# Patient Record
Sex: FEMALE | Race: WHITE | NOT HISPANIC OR LATINO | Employment: OTHER | URBAN - METROPOLITAN AREA
[De-identification: names, ages, dates, MRNs, and addresses within clinical notes are randomized per-mention and may not be internally consistent; named-entity substitution may affect disease eponyms.]

---

## 2017-03-30 ENCOUNTER — TELEPHONE (OUTPATIENT)
Dept: FAMILY MEDICINE | Facility: OTHER | Age: 67
End: 2017-03-30

## 2017-03-30 NOTE — LETTER
[facility]  [address]   [facility] [address]  Phone: [phone]  March 31, 2017      Marlene Khan  [address]  [facility] [address]      Dear Marlene,    We care about your health and have reviewed your health plan including your medical conditions, medications, and lab results.  Based on this review, it is recommended that you follow up regarding the following health topic(s):  -Colon Cancer Screening    We recommend you take the following action(s):  -schedule a COLONOSCOPY to look for colon cancer (due every 10 years or 5 years in higher risk situations.)  Colonoscopies can prevent 90-95% of colon cancer deaths.  Problem lesions can be removed before they ever become cancer.  If you do not wish to do a colonoscopy or cannot afford to do one at this time, there is another option called a Fecal Immunochemical Occult Blood Test (FIT) a take home stool sample kit.  It does not replace the colonoscopy for colorectal cancer screening, but it can detect hidden bleeding in the lower colon.  It does need to be repeated every year and if a positive result is obtained, you would be referred for a colonoscopy.  If you have completed either one of these tests at another facility, please have the records sent to our clinic for our records.     Please call us at the [facility] - [phone] (or use [employer]) to address the above recommendations.     Thank you for trusting [facility] and we appreciate the opportunity to serve you.  We look forward to supporting your healthcare needs in the future.    Healthy Regards,    Your Health Care Team  [facility]

## 2017-03-30 NOTE — TELEPHONE ENCOUNTER
Summary:    Patient is due/failing the following:   COLONOSCOPY    Action needed:   Schedule a colonoscopy or complete a FIT test    Type of outreach:    Phone, left message for patient to call back.     Questions for provider review:    None                                                                                                                                    Berta Mirza       Chart routed to Care Team .          Panel Management Review      Patient has the following on her problem list:     Asthma review     ACT Total Scores 12/2/2016   ACT TOTAL SCORE -   ASTHMA ER VISITS -   ASTHMA HOSPITALIZATIONS -   ACT TOTAL SCORE (Goal Greater than or Equal to 20) 25   In the past 12 months, how many times did you visit the emergency room for your asthma without being admitted to the hospital? 0   In the past 12 months, how many times were you hospitalized overnight because of your asthma? 0      1. Is Asthma diagnosis on the Problem List? Yes    2. Is Asthma listed on Health Maintenance? Yes    3. Patient is due for:  none      Composite cancer screening  Chart review shows that this patient is due/due soon for the following Colonoscopy

## 2017-04-11 ENCOUNTER — OFFICE VISIT (OUTPATIENT)
Dept: FAMILY MEDICINE | Facility: OTHER | Age: 67
End: 2017-04-11
Payer: COMMERCIAL

## 2017-04-11 VITALS
SYSTOLIC BLOOD PRESSURE: 128 MMHG | RESPIRATION RATE: 16 BRPM | HEART RATE: 64 BPM | WEIGHT: 207 LBS | TEMPERATURE: 98 F | HEIGHT: 69 IN | DIASTOLIC BLOOD PRESSURE: 78 MMHG | BODY MASS INDEX: 30.66 KG/M2

## 2017-04-11 DIAGNOSIS — Z12.11 SCREEN FOR COLON CANCER: ICD-10-CM

## 2017-04-11 DIAGNOSIS — R35.0 URINARY FREQUENCY: Primary | ICD-10-CM

## 2017-04-11 LAB
ALBUMIN UR-MCNC: NEGATIVE MG/DL
APPEARANCE UR: CLEAR
BACTERIA #/AREA URNS HPF: ABNORMAL /HPF
BILIRUB UR QL STRIP: NEGATIVE
COLOR UR AUTO: YELLOW
GLUCOSE UR STRIP-MCNC: NEGATIVE MG/DL
HGB UR QL STRIP: ABNORMAL
KETONES UR STRIP-MCNC: NEGATIVE MG/DL
LEUKOCYTE ESTERASE UR QL STRIP: NEGATIVE
NITRATE UR QL: NEGATIVE
NON-SQ EPI CELLS #/AREA URNS LPF: ABNORMAL /LPF
PH UR STRIP: 7 PH (ref 5–7)
RBC #/AREA URNS AUTO: ABNORMAL /HPF (ref 0–2)
SP GR UR STRIP: 1.02 (ref 1–1.03)
URN SPEC COLLECT METH UR: ABNORMAL
UROBILINOGEN UR STRIP-ACNC: 0.2 EU/DL (ref 0.2–1)
WBC #/AREA URNS AUTO: ABNORMAL /HPF (ref 0–2)

## 2017-04-11 PROCEDURE — 99213 OFFICE O/P EST LOW 20 MIN: CPT | Performed by: FAMILY MEDICINE

## 2017-04-11 PROCEDURE — 81001 URINALYSIS AUTO W/SCOPE: CPT | Performed by: FAMILY MEDICINE

## 2017-04-11 NOTE — NURSING NOTE
"Chief Complaint   Patient presents with     UTI     Health Maintenance     height, colon cancer, honoring choices, aap yearly       Initial /78 (BP Location: Right arm, Patient Position: Chair, Cuff Size: Adult Large)  Pulse 64  Temp 98  F (36.7  C) (Oral)  Resp 16  Ht 5' 8.9\" (1.75 m)  Wt 207 lb (93.9 kg)  BMI 30.66 kg/m2 Estimated body mass index is 30.66 kg/(m^2) as calculated from the following:    Height as of this encounter: 5' 8.9\" (1.75 m).    Weight as of this encounter: 207 lb (93.9 kg).  Medication Reconciliation: complete    "

## 2017-04-11 NOTE — PROGRESS NOTES
SUBJECTIVE:                                                    Marlene Khan is a 66 year old female who presents to clinic today for the following health issues:      HPI    URINARY TRACT SYMPTOMS     Onset: a month     Description:   Painful urination (Dysuria): YES  Blood in urine (Hematuria): YES- not visually   Delay in urine (Hesitency): YES  Not fully emptying bladder lately     Intensity: moderate    Progression of Symptoms:  same    Accompanying Signs & Symptoms:  Fever/chills: no   Flank pain YES  Nausea and vomiting: YES- nauseous   Any vaginal symptoms: none  Abdominal/Pelvic Pain: YES   History:   History of frequent UTI's: no   History of kidney stones: no   Sexually Active: YES  Possibility of pregnancy: No    Precipitating factors:   None          Therapies Tried and outcome: Cranberry juice prn (contraindicated in Coumadin patients)    Has low back pain , Difficulty emptying bladder and has burning sensation.  Has not had any children. Had hysterectomy for fibroids.      Problem list and histories reviewed & adjusted, as indicated.  Additional history: as documented      Patient Active Problem List   Diagnosis     Persistent disorder of initiating or maintaining sleep     Phobia     Lobular carcinoma in situ     Intermittent asthma     Advanced directives, counseling/discussion     Rosacea     DCIS (ductal carcinoma in situ) of breast     Past Surgical History:   Procedure Laterality Date     BIOPSY BREAST NEEDLE LOCALIZATION  12/2/2010    BIOPSY BREAST NEEDLE LOCALIZATION performed by CHATA DELACRUZ at  OR     BREAST LUMPECTOMY, RT/LT  12/2/2010    Breast Lumpectomy RT/LT followed by [facility]     C TOTAL ABDOM HYSTERECTOMY  11/24/00    Hysterectomy, Total Abdominal, lysis of adhesions     HC COLONOSCOPY W/WO BRUSH/WASH  08/03/00     HC REMOVAL OF OVARY/TUBE(S)  11/24/00    Salpingo-Oophorectomy, bilateral     HC REPAIR OF NASAL SEPTUM      correction of deviated nasal septum        Social History   Substance Use Topics     Smoking status: Never Smoker     Smokeless tobacco: Never Used      Comment: no smokers in household     Alcohol use Yes      Comment: Approx 3 drinks q week     Family History   Problem Relation Age of Onset     Arthritis Mother      Hypertension Mother      GASTROINTESTINAL DISEASE Mother      GERD     Prostate Cancer Father      Blood Disease Father      unknown what type at this point.     Prostate Cancer Brother      CANCER Maternal Aunt      breast cancer diagnosed in 50s         Current Outpatient Prescriptions   Medication Sig Dispense Refill     raloxifene (EVISTA) 60 MG tablet Take 1 tablet (60 mg) by mouth daily 90 tablet 3     doxycycline (VIBRAMYCIN) 50 MG capsule TAKE 1 CAPSULE (50 MG) BY MOUTH DAILY AS NEEDED 90 capsule 3     naproxen (NAPROSYN) 500 MG tablet Take 1 tablet (500 mg) by mouth 2 times daily as needed for moderate pain 30 tablet 1     triamcinolone (NASACORT AQ) 55 MCG/ACT nasal inhaler Spray 2 sprays into both nostrils daily 1 Bottle 11     Loratadine (CLARITIN PO)        multivitamin (OCUVITE) TABS Take 1 tablet by mouth daily       Multiple Vitamin (MULTIVITAMIN OR) Take 1 tablet by mouth daily.       Cranberry 300 MG TABS Take 1 tablet by mouth 2 times daily.       CALCIUM-VITAMIN D PO Take 2 tablets by mouth 2 times daily.       Allergies   Allergen Reactions     Augmentin      Latex      Sulfa Drugs      BP Readings from Last 3 Encounters:   04/11/17 128/78   12/02/16 140/80   07/13/16 139/80    Wt Readings from Last 3 Encounters:   04/11/17 207 lb (93.9 kg)   12/02/16 210 lb (95.3 kg)   04/06/16 203 lb 3.2 oz (92.2 kg)                  Labs reviewed in EPIC    ROS:  Constitutional, HEENT, cardiovascular, pulmonary, gi and gu systems are negative, except as otherwise noted.    OBJECTIVE:                                                    /78 (BP Location: Right arm, Patient Position: Chair, Cuff Size: Adult Large)  Pulse 64  Temp  "98  F (36.7  C) (Oral)  Resp 16  Ht 5' 8.9\" (1.75 m)  Wt 207 lb (93.9 kg)  BMI 30.66 kg/m2  Body mass index is 30.66 kg/(m^2).  Physical Exam   Constitutional: She appears well-developed and well-nourished.   HENT:   Head: Normocephalic and atraumatic.   Cardiovascular: Normal rate and regular rhythm.    Pulmonary/Chest: Effort normal and breath sounds normal.   Psychiatric: She has a normal mood and affect.         Diagnostic Test Results:  none      ASSESSMENT/PLAN:                                                      Problem List Items Addressed This Visit     None      Visit Diagnoses     Urinary frequency    -  Primary    Relevant Orders    *UA reflex to Microscopic and Culture ([place] and [facility] (except [place] and [place]) (Completed)    Urine Microscopic (Completed)    Screen for colon cancer           no signs of infection  Likely symptoms sec to atrophic vaginitis. Has h/o DCIS . Hormonal therapy not indicated. Advised lubricants    Alessia Mata MD  [facility]  "

## 2017-04-11 NOTE — MR AVS SNAPSHOT
"              After Visit Summary   4/11/2017    Marlene Khan    MRN: [id number]           Patient Information     Date Of Birth          1950        Visit Information        Provider Department      4/11/2017 11:40 AM Alessia Mata MD [facility]        Today's Diagnoses     Urinary frequency    -  1    Screen for colon cancer           Follow-ups after your visit        Who to contact     If you have questions or need follow up information about today's clinic visit or your schedule please contact [facility] directly at [phone].  Normal or non-critical lab and imaging results will be communicated to you by Jumohart, letter or phone within 4 business days after the clinic has received the results. If you do not hear from us within 7 days, please contact the clinic through ChipRewardst or phone. If you have a critical or abnormal lab result, we will notify you by phone as soon as possible.  Submit refill requests through RecordSled or call your pharmacy and they will forward the refill request to us. Please allow 3 business days for your refill to be completed.          Additional Information About Your Visit        MyChart Information     RecordSled gives you secure access to your electronic health record. If you see a primary care provider, you can also send messages to your care team and make appointments. If you have questions, please call your primary care clinic.  If you do not have a primary care provider, please call [phone] and they will assist you.        Care EveryWhere ID     This is your Care EveryWhere ID. This could be used by other organizations to access your [place] medical records  [id number]        Your Vitals Were     Pulse Temperature Respirations Height BMI (Body Mass Index)       64 98  F (36.7  C) (Oral) 16 5' 8.9\" (1.75 m) 30.66 kg/m2        Blood Pressure from Last 3 Encounters:   04/11/17 128/78   12/02/16 140/80   07/13/16 139/80    Weight " from Last 3 Encounters:   04/11/17 207 lb (93.9 kg)   12/02/16 210 lb (95.3 kg)   04/06/16 203 lb 3.2 oz (92.2 kg)              We Performed the Following     *UA reflex to Microscopic and Culture ([place] and [facility] (except [place] and [place])     Urine Microscopic        Primary Care Provider Office Phone # Fax #    Deedee ESTEVES MD [place] [phone]       [facility] [address]  [facility] [address]        Thank you!     Thank you for choosing [facility]  for your care. Our goal is always to provide you with excellent care. Hearing back from our patients is one way we can continue to improve our services. Please take a few minutes to complete the written survey that you may receive in the mail after your visit with us. Thank you!             Your Updated Medication List - Protect others around you: Learn how to safely use, store and throw away your medicines at www.disposemymeds.org.          This list is accurate as of: 4/11/17 12:43 PM.  Always use your most recent med list.                   Brand Name Dispense Instructions for use    CALCIUM-VITAMIN D PO      Take 2 tablets by mouth 2 times daily.       CLARITIN PO          Cranberry 300 MG Tabs      Take 1 tablet by mouth 2 times daily.       doxycycline 50 MG capsule    VIBRAMYCIN    90 capsule    TAKE 1 CAPSULE (50 MG) BY MOUTH DAILY AS NEEDED       * MULTIVITAMIN PO      Take 1 tablet by mouth daily.       * multivitamin Tabs tablet      Take 1 tablet by mouth daily       naproxen 500 MG tablet    NAPROSYN    30 tablet    Take 1 tablet (500 mg) by mouth 2 times daily as needed for moderate pain       raloxifene 60 MG tablet    Evista    90 tablet    Take 1 tablet (60 mg) by mouth daily       triamcinolone 55 MCG/ACT Inhaler    NASACORT AQ    1 Bottle    Spray 2 sprays into both nostrils daily       * Notice:  This list has 2 medication(s) that are the same as other medications prescribed for  you. Read the directions carefully, and ask your doctor or other care provider to review them with you.

## 2017-06-29 ENCOUNTER — TELEPHONE (OUTPATIENT)
Dept: FAMILY MEDICINE | Facility: OTHER | Age: 67
End: 2017-06-29

## 2017-06-29 NOTE — TELEPHONE ENCOUNTER
6/29/2017      Patient is interested in doing the FIT TEST, but will call back or call clinic to place order.        Outreach ,  Prince Beatty

## 2017-08-17 ENCOUNTER — TELEPHONE (OUTPATIENT)
Dept: FAMILY MEDICINE | Facility: OTHER | Age: 67
End: 2017-08-17

## 2017-08-17 NOTE — TELEPHONE ENCOUNTER
Summary:    Patient is due/failing the following:   FIT Test    Action needed:   Complete a FIT test     Type of outreach:    Phone, left message for patient to call back.     Questions for provider review:    None                                                                                                                                    Berta Mirza       Chart routed to Care Team .      Panel Management Review      Patient has the following on her problem list:     Asthma review     ACT Total Scores 12/2/2016   ACT TOTAL SCORE -   ASTHMA ER VISITS -   ASTHMA HOSPITALIZATIONS -   ACT TOTAL SCORE (Goal Greater than or Equal to 20) 25   In the past 12 months, how many times did you visit the emergency room for your asthma without being admitted to the hospital? 0   In the past 12 months, how many times were you hospitalized overnight because of your asthma? 0      1. Is Asthma diagnosis on the Problem List? Yes    2. Is Asthma listed on Health Maintenance? Yes    3. Patient is due for:  ACT        Composite cancer screening  Chart review shows that this patient is due/due soon for the following Colonoscopy

## 2017-08-17 NOTE — LETTER
[facility]  [address]   [facility] [place] [address]  Phone: [phone]  August 28, 2017      Marlene Khan  [address]  [facility] [address]      Dear Marlene,    We care about your health and have reviewed your health plan including your medical conditions, medications, and lab results.  Based on this review, it is recommended that you follow up regarding the following health topic(s):  -Colon Cancer Screening    We recommend you take the following action(s):  -schedule a COLONOSCOPY to look for colon cancer (due every 10 years or 5 years in higher risk situations.)  Colonoscopies can prevent 90-95% of colon cancer deaths.  Problem lesions can be removed before they ever become cancer.  If you do not wish to do a colonoscopy or cannot afford to do one at this time, there is another option called a Fecal Immunochemical Occult Blood Test (FIT) a take home stool sample kit.  It does not replace the colonoscopy for colorectal cancer screening, but it can detect hidden bleeding in the lower colon.  It does need to be repeated every year and if a positive result is obtained, you would be referred for a colonoscopy.  If you have completed either one of these tests at another facility, please have the records sent to our clinic for our records.     Please call us at the [facility] - [phone] (or use [employer]) to address the above recommendations.     Thank you for trusting [facility] and we appreciate the opportunity to serve you.  We look forward to supporting your healthcare needs in the future.    Healthy Regards,    Your Health Care Team  [facility]

## 2018-01-24 ENCOUNTER — TELEPHONE (OUTPATIENT)
Dept: FAMILY MEDICINE | Facility: OTHER | Age: 68
End: 2018-01-24

## 2018-01-24 NOTE — TELEPHONE ENCOUNTER
Summary:    Patient is due/failing the following:   FIT test     Action needed:   Complete a FIT test     Type of outreach:    Phone, left message for patient to call back.     Questions for provider review:    None                                                                                                                                    eBrta Mirza       Chart routed to Care Team .      Panel Management Review      Patient has the following on her problem list: None      Composite cancer screening  Chart review shows that this patient is due/due soon for the following Fecal Colorectal (FIT)

## 2018-01-24 NOTE — LETTER
[facility]  [address]   [facility] [place] [facility] [address]  Phone: [phone]  January 30, 2018      Marlene Khan  [address]  [facility] [address]      Dear Marlene,    We care about your health and have reviewed your health plan including your medical conditions, medications, and lab results.  Based on this review, it is recommended that you follow up regarding the following health topic(s):  -Colon Cancer Screening    We recommend you take the following action(s):  -schedule a COLONOSCOPY to look for colon cancer (due every 10 years or 5 years in higher risk situations.)  Colonoscopies can prevent 90-95% of colon cancer deaths.  Problem lesions can be removed before they ever become cancer.  If you do not wish to do a colonoscopy or cannot afford to do one at this time, there is another option called a Fecal Immunochemical Occult Blood Test (FIT) a take home stool sample kit.  It does not replace the colonoscopy for colorectal cancer screening, but it can detect hidden bleeding in the lower colon.  It does need to be repeated every year and if a positive result is obtained, you would be referred for a colonoscopy.  If you have completed either one of these tests at another facility, please have the records sent to our clinic for our records.     Please call us at the [facility] - [phone] (or use [employer]) to address the above recommendations.     Thank you for trusting [facility] and we appreciate the opportunity to serve you.  We look forward to supporting your healthcare needs in the future.    Healthy Regards,    Your Health Care Team  [facility]

## 2018-05-01 ENCOUNTER — TRANSFERRED RECORDS (OUTPATIENT)
Dept: HEALTH INFORMATION MANAGEMENT | Facility: CLINIC | Age: 68
End: 2018-05-01

## 2018-05-01 LAB — HEMOCCULT STL QL IA: NORMAL

## 2018-05-21 ENCOUNTER — OFFICE VISIT (OUTPATIENT)
Dept: FAMILY MEDICINE | Facility: OTHER | Age: 68
End: 2018-05-21
Payer: COMMERCIAL

## 2018-05-21 ENCOUNTER — TELEPHONE (OUTPATIENT)
Dept: FAMILY MEDICINE | Facility: OTHER | Age: 68
End: 2018-05-21

## 2018-05-21 VITALS
RESPIRATION RATE: 16 BRPM | HEART RATE: 76 BPM | TEMPERATURE: 98.2 F | SYSTOLIC BLOOD PRESSURE: 134 MMHG | WEIGHT: 211.2 LBS | OXYGEN SATURATION: 98 % | HEIGHT: 69 IN | DIASTOLIC BLOOD PRESSURE: 78 MMHG | BODY MASS INDEX: 31.28 KG/M2

## 2018-05-21 DIAGNOSIS — R30.0 DYSURIA: Primary | ICD-10-CM

## 2018-05-21 DIAGNOSIS — R30.0 DYSURIA: ICD-10-CM

## 2018-05-21 DIAGNOSIS — N89.8 VAGINAL DISCHARGE: Primary | ICD-10-CM

## 2018-05-21 LAB
ALBUMIN UR-MCNC: NEGATIVE MG/DL
APPEARANCE UR: CLEAR
BACTERIA #/AREA URNS HPF: ABNORMAL /HPF
BILIRUB UR QL STRIP: NEGATIVE
COLOR UR AUTO: YELLOW
GLUCOSE UR STRIP-MCNC: NEGATIVE MG/DL
HGB UR QL STRIP: ABNORMAL
KETONES UR STRIP-MCNC: NEGATIVE MG/DL
LEUKOCYTE ESTERASE UR QL STRIP: ABNORMAL
NITRATE UR QL: NEGATIVE
NON-SQ EPI CELLS #/AREA URNS LPF: ABNORMAL /LPF
PH UR STRIP: 5.5 PH (ref 5–7)
RBC #/AREA URNS AUTO: ABNORMAL /HPF
SOURCE: ABNORMAL
SP GR UR STRIP: 1.02 (ref 1–1.03)
SPECIMEN SOURCE: NORMAL
UROBILINOGEN UR STRIP-ACNC: 0.2 EU/DL (ref 0.2–1)
WBC #/AREA URNS AUTO: ABNORMAL /HPF
WET PREP SPEC: NORMAL

## 2018-05-21 PROCEDURE — 99213 OFFICE O/P EST LOW 20 MIN: CPT | Performed by: FAMILY MEDICINE

## 2018-05-21 PROCEDURE — 87210 SMEAR WET MOUNT SALINE/INK: CPT | Performed by: FAMILY MEDICINE

## 2018-05-21 PROCEDURE — 81001 URINALYSIS AUTO W/SCOPE: CPT | Performed by: FAMILY MEDICINE

## 2018-05-21 RX ORDER — CIPROFLOXACIN 500 MG/1
500 TABLET, FILM COATED ORAL 2 TIMES DAILY
Qty: 10 TABLET | Refills: 0 | Status: SHIPPED | OUTPATIENT
Start: 2018-05-21 | End: 2018-05-26

## 2018-05-21 ASSESSMENT — PAIN SCALES - GENERAL: PAINLEVEL: MODERATE PAIN (4)

## 2018-05-21 NOTE — PROGRESS NOTES
SUBJECTIVE:   Marlene Khan is a 67 year old female who presents to clinic today for the following health issues:      HPI     URINARY TRACT SYMPTOMS  Onset:     Description:   Painful urination (Dysuria): YES- Burning Feeling  Blood in urine (Hematuria): YES  Delay in urine (Hesitency): YES    Intensity: moderate    Progression of Symptoms:  worsening    Accompanying Signs & Symptoms:  Fever/chills: no   Flank pain YES- low back  Nausea and vomiting: no   Any vaginal symptoms: vaginal discharge and vaginal itching  Abdominal/Pelvic Pain: YES- low abdomen    History:   History of frequent UTI's: YES  History of kidney stones: no   Sexually Active: YES  Possibility of pregnancy: No  Therapies Tried and outcome: Cranberry tablet        Problem list and histories reviewed & adjusted, as indicated.  Additional history: as documented        Patient Active Problem List   Diagnosis     Persistent disorder of initiating or maintaining sleep     Phobia     Lobular carcinoma in situ     Intermittent asthma     Advanced directives, counseling/discussion     Rosacea     DCIS (ductal carcinoma in situ) of breast     Past Surgical History:   Procedure Laterality Date     BIOPSY BREAST NEEDLE LOCALIZATION  12/2/2010    BIOPSY BREAST NEEDLE LOCALIZATION performed by CHATA DELACRUZ at  OR     BREAST LUMPECTOMY, RT/LT  12/2/2010    Breast Lumpectomy RT/LT [facility]     C TOTAL ABDOM HYSTERECTOMY  11/24/00    Hysterectomy, Total Abdominal, lysis of adhesions     HC COLONOSCOPY W/WO BRUSH/WASH  08/03/00     HC REMOVAL OF OVARY/TUBE(S)  11/24/00    Salpingo-Oophorectomy, bilateral     HC REPAIR OF NASAL SEPTUM      correction of deviated nasal septum       Social History   Substance Use Topics     Smoking status: Never Smoker     Smokeless tobacco: Never Used      Comment: no smokers in household     Alcohol use Yes      Comment: Approx 3 drinks q week     Family History   Problem Relation Age of Onset      "Arthritis Mother      Hypertension Mother      GASTROINTESTINAL DISEASE Mother      GERD     Prostate Cancer Father      Blood Disease Father      unknown what type at this point.     Prostate Cancer Brother      CANCER Maternal Aunt      breast cancer diagnosed in 50s         Current Outpatient Prescriptions   Medication Sig Dispense Refill     CALCIUM-VITAMIN D PO Take 2 tablets by mouth 2 times daily.       Cranberry 300 MG TABS Take 1 tablet by mouth 2 times daily.       doxycycline (VIBRAMYCIN) 50 MG capsule TAKE 1 CAPSULE (50 MG) BY MOUTH DAILY AS NEEDED 90 capsule 3     Loratadine (CLARITIN PO)        Multiple Vitamin (MULTIVITAMIN OR) Take 1 tablet by mouth daily.       multivitamin (OCUVITE) TABS Take 1 tablet by mouth daily       naproxen (NAPROSYN) 500 MG tablet Take 1 tablet (500 mg) by mouth 2 times daily as needed for moderate pain 30 tablet 1     triamcinolone (NASACORT AQ) 55 MCG/ACT nasal inhaler Spray 2 sprays into both nostrils daily 1 Bottle 11     raloxifene (EVISTA) 60 MG tablet Take 1 tablet (60 mg) by mouth daily (Patient not taking: Reported on 5/21/2018) 90 tablet 3     Allergies   Allergen Reactions     Augmentin      Latex      Sulfa Drugs      BP Readings from Last 3 Encounters:   05/21/18 134/78   04/11/17 128/78   12/02/16 140/80    Wt Readings from Last 3 Encounters:   05/21/18 211 lb 3.2 oz (95.8 kg)   04/11/17 207 lb (93.9 kg)   12/02/16 210 lb (95.3 kg)                  Labs reviewed in EPIC    ROS:  Constitutional, HEENT, cardiovascular, pulmonary, gi and gu systems are negative, except as otherwise noted.    OBJECTIVE:     /78 (BP Location: Right arm, Patient Position: Chair, Cuff Size: Adult Regular)  Pulse 76  Temp 98.2  F (36.8  C) (Temporal)  Resp 16  Ht 5' 8.9\" (1.75 m)  Wt 211 lb 3.2 oz (95.8 kg)  SpO2 98%  BMI 31.28 kg/m2  Body mass index is 31.28 kg/(m^2).   Physical Exam   Constitutional: She appears well-developed and well-nourished.   HENT:   Head: " Normocephalic and atraumatic.   Left Ear: External ear normal.   Nose: Nose normal.   Mouth/Throat: No oropharyngeal exudate.   Cardiovascular: Normal rate, regular rhythm, normal heart sounds and intact distal pulses.  Exam reveals no gallop.    No murmur heard.  Pulmonary/Chest: Effort normal and breath sounds normal.         Diagnostic Test Results:  none     ASSESSMENT/PLAN:     Problem List Items Addressed This Visit     None      Visit Diagnoses     Vaginal discharge    -  Primary    Relevant Orders    Wet prep (Completed)    Dysuria        Relevant Orders    Urine Microscopic (Completed)       u/a shows signs of improving infection. She might need a longer course of abx. Will extend course by 1 more week to ensure resolution    Alessia Mata MD  [facility]

## 2018-05-21 NOTE — TELEPHONE ENCOUNTER
Please call Raquel after 5:15 today and inform of normal wet prep result,  No infection present.     Thanks

## 2018-05-21 NOTE — TELEPHONE ENCOUNTER
Reason for Call:  Same Day Appointment, Requested Provider:  any     PCP: Deedee Wilde    Reason for visit:  UTI    Duration of symptoms: 2-3 days    Have you been treated for this in the past? No    Additional comments:  Per guidelines patient needs to have been seen in past year in order to do phone visit.  It has been over a year so will need office visit.     Can we leave a detailed message on this number? YES    Phone number patient can be reached at: Home number on file [phone] (home)    Best Time: any    Call taken on 5/21/2018 at 12:34 PM by Desi Edmonds

## 2018-11-01 NOTE — PROGRESS NOTES
SUBJECTIVE:   Marlene Khan is a 67 year old female who presents to clinic today for the following health issues:      HPI  URINARY TRACT SYMPTOMS  Onset: 2-3 weeks    Description:   Painful urination (Dysuria): YES- Burning  Blood in urine (Hematuria): no   Delay in urine (Hesitency): YES    Intensity: moderate    Progression of Symptoms:  worsening    Accompanying Signs & Symptoms:  Fever/chills: YES- Occasional Chills  Flank pain YES  Nausea and vomiting: YES  Any vaginal symptoms: vaginal discharge  Abdominal/Pelvic Pain: YES- Pressure    History:   History of frequent UTI's: YES  History of kidney stones: no   Sexually Active: YES  Possibility of pregnancy: No    Precipitating factors:   None    Therapies Tried and outcome: Cranberry juice prn (contraindicated in Coumadin patients) and Increase fluid intake    - She has a history of UTIs.   - She says she doesn't have pain with urination but feels like she is hesitant in starting to urinate and doesn't empty completely which results in urinary frequency.  Frequency now affecting her at night, twice per night the last week or so.   - She does have vaginal discharge, she says more than normal but no itching or odor.   - no concerns about STDs  - She is post menopausal, does admit to having vaginal dryness at times.   - She has noticed some increased gas, sometimes when bearing down to have a bowel movement will have a pain in her abdomen that goes away afterwards.  But declines constipation or diarrhea.  Her BM pattern has been normal.     Problem list and histories reviewed & adjusted, as indicated.  Additional history: as documented    Patient Active Problem List   Diagnosis     Persistent disorder of initiating or maintaining sleep     Phobia     Lobular carcinoma in situ     Intermittent asthma     Advanced directives, counseling/discussion     Rosacea     DCIS (ductal carcinoma in situ) of breast     Past Surgical History:   Procedure Laterality Date      BIOPSY BREAST NEEDLE LOCALIZATION  12/2/2010    BIOPSY BREAST NEEDLE LOCALIZATION performed by CHATA DELACRUZ at  OR     BREAST LUMPECTOMY, RT/LT  12/2/2010    Breast Lumpectomy RT/LT followed by [facility] TOTAL ABDOM HYSTERECTOMY  11/24/00    Hysterectomy, Total Abdominal, lysis of adhesions     HC COLONOSCOPY W/WO BRUSH/WASH  08/03/00     HC REMOVAL OF OVARY/TUBE(S)  11/24/00    Salpingo-Oophorectomy, bilateral     HC REPAIR OF NASAL SEPTUM      correction of deviated nasal septum       Social History   Substance Use Topics     Smoking status: Never Smoker     Smokeless tobacco: Never Used      Comment: no smokers in household     Alcohol use Yes      Comment: Approx 3 drinks q week     Family History   Problem Relation Age of Onset     Arthritis Mother      Hypertension Mother      GASTROINTESTINAL DISEASE Mother      GERD     Prostate Cancer Father      Blood Disease Father      unknown what type at this point.     Prostate Cancer Brother      Cancer Maternal Aunt      breast cancer diagnosed in 50s         Current Outpatient Prescriptions   Medication Sig Dispense Refill     CALCIUM-VITAMIN D PO Take 2 tablets by mouth 2 times daily.       Cranberry 300 MG TABS Take 1 tablet by mouth 2 times daily.       doxycycline (VIBRAMYCIN) 50 MG capsule TAKE 1 CAPSULE (50 MG) BY MOUTH DAILY AS NEEDED 90 capsule 0     Loratadine (CLARITIN PO)        metroNIDAZOLE (METROGEL) 1 % gel Apply 1 g topically daily 60 g 3     Multiple Vitamin (MULTIVITAMIN OR) Take 1 tablet by mouth daily.       multivitamin (OCUVITE) TABS Take 1 tablet by mouth daily       naproxen (NAPROSYN) 500 MG tablet Take 1 tablet (500 mg) by mouth 2 times daily as needed for moderate pain 30 tablet 1     triamcinolone (NASACORT AQ) 55 MCG/ACT nasal inhaler Spray 2 sprays into both nostrils daily 1 Bottle 11     raloxifene (EVISTA) 60 MG tablet Take 1 tablet (60 mg) by mouth daily (Patient not taking: Reported on 5/21/2018) 90 tablet 3        ROS:  Constitutional, HEENT, cardiovascular, pulmonary, gi and gu systems are negative, except as otherwise noted.    OBJECTIVE:     /80 (Cuff Size: Adult Regular)  Pulse 72  Temp 97.6  F (36.4  C) (Oral)  Resp 16  Wt 206 lb (93.4 kg)  BMI 30.51 kg/m2  Body mass index is 30.51 kg/(m^2).  GENERAL: healthy, alert and no distress  RESP: lungs clear to auscultation - no rales, rhonchi or wheezes  CV: regular rate and rhythm, normal S1 S2, no S3 or S4, no murmur, click or rub, no peripheral edema and peripheral pulses strong  ABDOMEN: soft, nontender, no hepatosplenomegaly, no masses and bowel sounds normal   (female): normal female external genitalia, normal urethral meatus, vaginal mucosa, normal cervix/adnexa/uterus without masses or discharge  MS: no gross musculoskeletal defects noted, no edema  SKIN: bilateral cheeks and forehead mild macular erythema without papules present.     Diagnostic Test Results:  Results for orders placed or performed in visit on 11/02/18 (from the past 24 hour(s))   *UA reflex to Microscopic   Result Value Ref Range    Color Urine Yellow     Appearance Urine Clear     Glucose Urine Negative NEG^Negative mg/dL    Bilirubin Urine Negative NEG^Negative    Ketones Urine Negative NEG^Negative mg/dL    Specific Gravity Urine 1.020 1.003 - 1.035    Blood Urine Trace (A) NEG^Negative    pH Urine 6.0 5.0 - 7.0 pH    Protein Albumin Urine Negative NEG^Negative mg/dL    Urobilinogen Urine 0.2 0.2 - 1.0 EU/dL    Nitrite Urine Negative NEG^Negative    Leukocyte Esterase Urine Negative NEG^Negative    Source Midstream Urine    Urine Microscopic   Result Value Ref Range    WBC Urine 0 - 5 OTO5^0 - 5 /HPF    RBC Urine O - 2 OTO2^O - 2 /HPF    Mucous Urine Present (A) NEG^Negative /LPF   Wet prep   Result Value Ref Range    Specimen Description Vagina     Wet Prep No Trichomonas seen     Wet Prep No clue cells seen     Wet Prep No yeast seen        ASSESSMENT/PLAN:       ICD-10-CM    1.  "Dysuria R30.0 *UA reflex to Microscopic     Wet prep     Urine Microscopic     Urine Culture Aerobic Bacterial   2. Rosacea L71.9 metroNIDAZOLE (METROGEL) 1 % gel     doxycycline (VIBRAMYCIN) 50 MG capsule     1. Reviewed her UA and her wet prep today.  On dip there is trace blood but micro shows 0-2 therefore not concerned.  Did obtain a culture to verify this is not a UTI.  Her wet prep was negative.  No significant signs of atrophy or dryness vaginally on exam but discussed that being post menopausal this could be contributing to some of her urinary retention and frequency.  She has history of breast cancer and hormones are not suggested.  Can try OTC vagasil and wear cotton underwear that breathe well.  If not improving and negative urine culture recommend either follow-up with urology or OB/GYN.  We also discussed if she has some underlying constipation despite \"regular bowel movements\" but having increased gas and having to bear down with bowel movement that improving these symptoms can result in resolution of  symptoms, started on Docusate sodium 100 mg twice per day, increase water intake.      2. Rosacea present and chronic, wondering about topical.  Will try Metrogel once daily application, watch for side effects which were discussed can discontinue doxycycline but use as needed for flares.     Follow-up if symptoms are not improving in the next week, sooner if worse or new concerns.       Options for treatment and follow-up care were reviewed with the patient and/or guardian. Patient and/or guardian engaged in the decision making process and verbalized understanding of the options discussed and agreed with the final plan.      Rocio Redd PA-C  [facility]"

## 2018-11-02 ENCOUNTER — OFFICE VISIT (OUTPATIENT)
Dept: FAMILY MEDICINE | Facility: OTHER | Age: 68
End: 2018-11-02
Payer: COMMERCIAL

## 2018-11-02 VITALS
WEIGHT: 206 LBS | HEART RATE: 72 BPM | TEMPERATURE: 97.6 F | DIASTOLIC BLOOD PRESSURE: 80 MMHG | RESPIRATION RATE: 16 BRPM | SYSTOLIC BLOOD PRESSURE: 136 MMHG | BODY MASS INDEX: 30.51 KG/M2

## 2018-11-02 DIAGNOSIS — R30.0 DYSURIA: Primary | ICD-10-CM

## 2018-11-02 DIAGNOSIS — L71.9 ROSACEA: ICD-10-CM

## 2018-11-02 LAB
ALBUMIN UR-MCNC: NEGATIVE MG/DL
APPEARANCE UR: CLEAR
BILIRUB UR QL STRIP: NEGATIVE
COLOR UR AUTO: YELLOW
GLUCOSE UR STRIP-MCNC: NEGATIVE MG/DL
HGB UR QL STRIP: ABNORMAL
KETONES UR STRIP-MCNC: NEGATIVE MG/DL
LEUKOCYTE ESTERASE UR QL STRIP: NEGATIVE
MUCOUS THREADS #/AREA URNS LPF: PRESENT /LPF
NITRATE UR QL: NEGATIVE
PH UR STRIP: 6 PH (ref 5–7)
RBC #/AREA URNS AUTO: ABNORMAL /HPF
SOURCE: ABNORMAL
SP GR UR STRIP: 1.02 (ref 1–1.03)
SPECIMEN SOURCE: NORMAL
UROBILINOGEN UR STRIP-ACNC: 0.2 EU/DL (ref 0.2–1)
WBC #/AREA URNS AUTO: ABNORMAL /HPF
WET PREP SPEC: NORMAL

## 2018-11-02 PROCEDURE — 87086 URINE CULTURE/COLONY COUNT: CPT | Performed by: PHYSICIAN ASSISTANT

## 2018-11-02 PROCEDURE — 81001 URINALYSIS AUTO W/SCOPE: CPT | Performed by: PHYSICIAN ASSISTANT

## 2018-11-02 PROCEDURE — 99214 OFFICE O/P EST MOD 30 MIN: CPT | Performed by: PHYSICIAN ASSISTANT

## 2018-11-02 PROCEDURE — 87210 SMEAR WET MOUNT SALINE/INK: CPT | Performed by: PHYSICIAN ASSISTANT

## 2018-11-02 RX ORDER — METRONIDAZOLE 10 MG/G
1 GEL TOPICAL DAILY
Qty: 60 G | Refills: 3 | Status: SHIPPED | OUTPATIENT
Start: 2018-11-02 | End: 2018-12-04

## 2018-11-02 RX ORDER — DOXYCYCLINE HYCLATE 50 MG/1
CAPSULE ORAL
Qty: 90 CAPSULE | Refills: 0 | Status: SHIPPED | OUTPATIENT
Start: 2018-11-02 | End: 2019-03-05

## 2018-11-02 ASSESSMENT — PAIN SCALES - GENERAL: PAINLEVEL: MILD PAIN (3)

## 2018-11-02 NOTE — MR AVS SNAPSHOT
After Visit Summary   11/2/2018    Marlene Khan    MRN: [id number]           Patient Information     Date Of Birth          1950        Visit Information        Provider Department      11/2/2018 10:40 AM Rocio Redd PA-C [facility]        Today's Diagnoses     Dysuria    -  1    Rosacea          Care Instructions    - Docusate Sodium 100 mg twice per day, increase water intake total 2 liters per day throughout the day.   - Over the counter vagasil products if needed.   - We may want to get you to see Urology or OBGYN if your symptoms are not improving and because of your risk factors with use of hormone products.   - For the rosacea apply the topical metronidazole gel once per day, monitor for side effects, skin sensitivity.  Ok to use doxycycline or could wait and only use if needed.               Follow-ups after your visit        Your next 10 appointments already scheduled     Dec 04, 2018  9:40 AM CST   Pre-Op physical with Deedee Wilde MD   [facility]    [address] [place] [id number] [phone]              Who to contact     If you have questions or need follow up information about today's clinic visit or your schedule please contact [facility] directly at [phone].  Normal or non-critical lab and imaging results will be communicated to you by MyChart, letter or phone within 4 business days after the clinic has received the results. If you do not hear from us within 7 days, please contact the clinic through MyChart or phone. If you have a critical or abnormal lab result, we will notify you by phone as soon as possible.  Submit refill requests through [employer] or call your pharmacy and they will forward the refill request to us. Please allow 3 business days for your refill to be completed.          Additional Information About Your Visit        MyChart Information     [employer]  gives you secure access to your electronic health record. If you see a primary care provider, you can also send messages to your care team and make appointments. If you have questions, please call your primary care clinic.  If you do not have a primary care provider, please call [phone] and they will assist you.        Care EveryWhere ID     This is your Care EveryWhere ID. This could be used by other organizations to access your [place] medical records  [id number]        Your Vitals Were     Pulse Temperature Respirations BMI (Body Mass Index)          72 97.6  F (36.4  C) (Oral) 16 30.51 kg/m2         Blood Pressure from Last 3 Encounters:   11/02/18 136/80   05/21/18 134/78   04/11/17 128/78    Weight from Last 3 Encounters:   11/02/18 206 lb (93.4 kg)   05/21/18 211 lb 3.2 oz (95.8 kg)   04/11/17 207 lb (93.9 kg)              We Performed the Following     *UA reflex to Microscopic     Urine Culture Aerobic Bacterial     Urine Microscopic     Wet prep          Today's Medication Changes          These changes are accurate as of 11/2/18 11:20 AM.  If you have any questions, ask your nurse or doctor.               Start taking these medicines.        Dose/Directions    metroNIDAZOLE 1 % gel   Commonly known as:  METROGEL   Used for:  Rosacea   Started by:  Rocio Redd PA-C        Dose:  1 applicator   Apply 1 g topically daily   Quantity:  60 g   Refills:  3            Where to get your medicines      These medications were sent to [facility] [address] [place] [address] [facility]  [address] [facility] [address]     Phone:  [phone]     doxycycline 50 MG capsule    metroNIDAZOLE 1 % gel                Primary Care Provider Office Phone # Fax #    Deedee Wilde [phone]       [address] [facility] [address]  [facility] [address]        Equal Access to Services     RICHARD GARCIA AH: Sinai Carreon, cassidy warren, marlys jewell  tejas silverashley laOtisrfances ah. So [facility] [phone].    ATENCIÓN: Si yolyla ashley, tiene a jacob disposición servicios gratuitos de asistencia lingüística. Gloria judge [phone].    We comply with applicable federal civil rights laws and [place] laws. We do not discriminate on the basis of race, color, national origin, age, disability, sex, sexual orientation, or gender identity.            Thank you!     Thank you for choosing [facility]  for your care. Our goal is always to provide you with excellent care. Hearing back from our patients is one way we can continue to improve our services. Please take a few minutes to complete the written survey that you may receive in the mail after your visit with us. Thank you!             Your Updated Medication List - Protect others around you: Learn how to safely use, store and throw away your medicines at www.disposemymeds.org.          This list is accurate as of 11/2/18 11:20 AM.  Always use your most recent med list.                   Brand Name Dispense Instructions for use Diagnosis    CALCIUM-VITAMIN D PO      Take 2 tablets by mouth 2 times daily.        CLARITIN PO           Cranberry 300 MG Tabs      Take 1 tablet by mouth 2 times daily.        doxycycline 50 MG capsule    VIBRAMYCIN    90 capsule    TAKE 1 CAPSULE (50 MG) BY MOUTH DAILY AS NEEDED    Rosacea       metroNIDAZOLE 1 % gel    METROGEL    60 g    Apply 1 g topically daily    Rosacea       * MULTIVITAMIN PO      Take 1 tablet by mouth daily.        * multivitamin Tabs tablet      Take 1 tablet by mouth daily        naproxen 500 MG tablet    NAPROSYN    30 tablet    Take 1 tablet (500 mg) by mouth 2 times daily as needed for moderate pain    Midline low back pain without sciatica, Iliotibial band tendonitis, unspecified laterality       raloxifene 60 MG tablet    Evista    90 tablet    Take 1 tablet (60 mg) by mouth daily    DCIS (ductal carcinoma in situ) of breast, unspecified laterality        triamcinolone 55 MCG/ACT inhaler    NASACORT AQ    1 Bottle    Spray 2 sprays into both nostrils daily    Chronic rhinitis       * Notice:  This list has 2 medication(s) that are the same as other medications prescribed for you. Read the directions carefully, and ask your doctor or other care provider to review them with you.

## 2018-11-02 NOTE — PATIENT INSTRUCTIONS
- Docusate Sodium 100 mg twice per day, increase water intake total 2 liters per day throughout the day.   - Over the counter vagasil products if needed.   - We may want to get you to see Urology or OBGYN if your symptoms are not improving and because of your risk factors with use of hormone products.   - For the rosacea apply the topical metronidazole gel once per day, monitor for side effects, skin sensitivity.  Ok to use doxycycline or could wait and only use if needed.

## 2018-11-03 LAB
BACTERIA SPEC CULT: NORMAL
Lab: NORMAL
SPECIMEN SOURCE: NORMAL

## 2018-11-05 ENCOUNTER — TRANSFERRED RECORDS (OUTPATIENT)
Dept: HEALTH INFORMATION MANAGEMENT | Facility: CLINIC | Age: 68
End: 2018-11-05

## 2018-11-05 ENCOUNTER — TELEPHONE (OUTPATIENT)
Dept: FAMILY MEDICINE | Facility: OTHER | Age: 68
End: 2018-11-05

## 2018-11-05 NOTE — TELEPHONE ENCOUNTER
LM for pt to return call. Pt doesn't use [facility].   Please call patient.  Her UC showed no signs of a UTI infection.  Continue with plan as discussed.     Rocio Sagastume MA  November 5, 2018

## 2018-11-29 NOTE — PATIENT INSTRUCTIONS
Stop in at our [facility] pharmacy to get your blood pressure rechecked.    Before Your Surgery      Call your surgeon if there is any change in your health. This includes signs of a cold or flu (such as a sore throat, runny nose, cough, rash or fever).    Do not smoke, drink alcohol or take over the counter medicine (unless your surgeon or primary care doctor tells you to) for the 24 hours before and after surgery.    If you take prescribed drugs: Follow your doctor s orders about which medicines to take and which to stop until after surgery.    Eating and drinking prior to surgery: follow the instructions from your surgeon    Take a shower or bath the night before surgery. Use the soap your surgeon gave you to gently clean your skin. If you do not have soap from your surgeon, use your regular soap. Do not shave or scrub the surgery site.  Wear clean pajamas and have clean sheets on your bed.

## 2018-11-29 NOTE — PROGRESS NOTES
[address]  [facility] [address]  [phone]  Dept: [phone]    PRE-OP EVALUATION:  Today's date: 2018    Marlene Khan (: 1950) presents for pre-operative evaluation assessment as requested by Dr. Tena.  She requires evaluation and anesthesia risk assessment prior to undergoing surgery/procedure for treatment of eyes .    Fax number for surgical facility: [phone]  Primary Physician: Deedee Wilde  Type of Anesthesia Anticipated: Topical    Patient has a Health Care Directive or Living Will:  YES     Preop Questions 2018   Who is doing your surgery? Dr. Baldomero Tena   What are you having done? Cataract Surgery   Date of Surgery/Procedure:  and 2018   Facility or Hospital where procedure/surgery will be performed: [place], [place]   1.  Do you have a history of Heart attack, stroke, stent, coronary bypass surgery, or other heart surgery? No   2.  Do you ever have any pain or discomfort in your chest? No   3.  Do you have a history of  Heart Failure? No   4.   Are you troubled by shortness of breath when:  walking on a level surface, or up a slight hill, or at night? No   5.  Do you currently have a cold, bronchitis or other respiratory infection? No   6.  Do you have a cough, shortness of breath, or wheezing? No   7.  Do you sometimes get pains in the calves of your legs when you walk? No   8. Do you or anyone in your family have previous history of blood clots? No   9.  Do you or does anyone in your family have a serious bleeding problem such as prolonged bleeding following surgeries or cuts? No   10. Have you ever had problems with anemia or been told to take iron pills? No   11. Have you had any abnormal blood loss such as black, tarry or bloody stools, or abnormal vaginal bleeding? No   12. Have you ever had a blood transfusion? No   13. Have you or any of your relatives ever had problems  with anesthesia? No   14. Do you have sleep apnea, excessive snoring or daytime drowsiness? No   15. Do you have any prosthetic heart valves? No   16. Do you have prosthetic joints? No   17. Is there any chance that you may be pregnant? No       HPI:     HPI related to upcoming procedure: cataracts      See problem list for active medical problems.  Problems all longstanding and stable, except as noted/documented.  See ROS for pertinent symptoms related to these conditions.                                                                                                                                                          .    MEDICAL HISTORY:     Patient Active Problem List    Diagnosis Date Noted     DCIS (ductal carcinoma in situ) of breast 11/20/2012     Priority: Medium     Advanced directives, counseling/discussion 08/16/2011     Priority: Medium     Advance Directive Problem List Overview:   Name Relationship Phone    Primary Health Care Agent            Alternative Health Care Agent          Discussed advance care planning with patient; however, patient declined at this time. 8/16/2011          Rosacea 08/16/2011     Priority: Medium     Intermittent asthma 02/10/2011     Priority: Medium     Lobular carcinoma in situ 10/21/2010     Priority: Medium     IMO update changed this record. Please review for accuracy       Phobia 11/19/2004     Priority: Medium     Persistent disorder of initiating or maintaining sleep      Priority: Medium      Past Medical History:   Diagnosis Date     Abnormal Papanicolaou smear of cervix and cervical HPV     Abn. Pap smear (cervix), age 24  s/p cryotherapy     Anxiety state, unspecified     anxiety with public speaking     Cough      Excessive or frequent menstruation      Other congenital anomaly of uterus     uterine fibroids     Rosacea 8/16/2011     Unspecified sinusitis (chronic)     Chronic sinusitis     Past Surgical History:   Procedure Laterality Date     BIOPSY  BREAST NEEDLE LOCALIZATION  12/2/2010    BIOPSY BREAST NEEDLE LOCALIZATION performed by CHATA DELACRUZ at  OR     BREAST LUMPECTOMY, RT/LT  12/2/2010    Breast Lumpectomy RT/LT followed by [facility] TOTAL ABDOM HYSTERECTOMY  11/24/00    Hysterectomy, Total Abdominal, lysis of adhesions     HC COLONOSCOPY W/WO BRUSH/WASH  08/03/00     HC REMOVAL OF OVARY/TUBE(S)  11/24/00    Salpingo-Oophorectomy, bilateral     HC REPAIR OF NASAL SEPTUM      correction of deviated nasal septum     Current Outpatient Prescriptions   Medication Sig Dispense Refill     CALCIUM-VITAMIN D PO Take 2 tablets by mouth 2 times daily.       Cranberry 300 MG TABS Take 1 tablet by mouth 2 times daily.       docusate sodium (COLACE) 100 MG capsule Take 100 mg by mouth 2 times daily       doxycycline (VIBRAMYCIN) 50 MG capsule TAKE 1 CAPSULE (50 MG) BY MOUTH DAILY AS NEEDED 90 capsule 0     multivitamin (OCUVITE) TABS Take 1 tablet by mouth daily       OTC products: None, except as noted above    Allergies   Allergen Reactions     Augmentin      Latex      Sulfa Drugs       Latex Allergy: NO    Social History   Substance Use Topics     Smoking status: Never Smoker     Smokeless tobacco: Never Used      Comment: no smokers in household     Alcohol use Yes      Comment: Approx 3 drinks q week     History   Drug Use No       REVIEW OF SYSTEMS:   CONSTITUTIONAL: NEGATIVE for fever, chills, change in weight  INTEGUMENTARY/SKIN: NEGATIVE for worrisome rashes, moles or lesions  ENT/MOUTH: NEGATIVE for ear, mouth and throat problems  RESP: NEGATIVE for significant cough or SOB  CV: NEGATIVE for chest pain, palpitations or peripheral edema  GI: NEGATIVE for nausea, abdominal pain, heartburn, or change in bowel habits  : NEGATIVE for frequency, dysuria, or hematuria  MUSCULOSKELETAL: NEGATIVE for significant arthralgias or myalgia  NEURO: NEGATIVE for weakness, dizziness or paresthesias  ENDOCRINE: NEGATIVE for temperature intolerance,  skin/hair changes  HEME: NEGATIVE for bleeding problems  PSYCHIATRIC: NEGATIVE for changes in mood or affect    EXAM:   /90  Pulse 79  Temp 98.3  F (36.8  C) (Temporal)  Resp 14  Wt 206 lb (93.4 kg)  SpO2 96%  BMI 30.51 kg/m2    GENERAL APPEARANCE: healthy, alert and no distress     EYES: EOMI, PERRL     HENT: ear canals and TM's normal and nose and mouth without ulcers or lesions     NECK: no adenopathy, no asymmetry, masses, or scars and thyroid normal to palpation     RESP: lungs clear to auscultation - no rales, rhonchi or wheezes     CV: regular rates and rhythm, normal S1 S2, no S3 or S4 and no murmur, click or rub     ABDOMEN:  soft, nontender, no HSM or masses and bowel sounds normal     MS: extremities normal- no gross deformities noted, no evidence of inflammation in joints, FROM in all extremities.     SKIN: no suspicious lesions or rashes     NEURO: Normal strength and tone, sensory exam grossly normal, mentation intact and speech normal     PSYCH: mentation appears normal. and affect normal/bright     LYMPHATICS: No cervical adenopathy    DIAGNOSTICS:   No labs or EKG required for low risk surgery (cataract, skin procedure, breast biopsy, etc)    Recent Labs   Lab Test  12/02/16   1015  11/20/12   1120  12/15/11   1436  10/04/10   1059   HGB  13.9   --    --   13.4   PLT  262   --    --   290   NA   --   143  145*   --    POTASSIUM   --   4.7  4.7   --    CR   --   0.86  0.90   --         IMPRESSION:   Reason for surgery/procedure: cataracts    The proposed surgical procedure is considered LOW risk.    REVISED CARDIAC RISK INDEX  The patient has the following serious cardiovascular risks for perioperative complications such as (MI, PE, VFib and 3  AV Block):  No serious cardiac risks  INTERPRETATION: 0 risks: Class I (very low risk - 0.4% complication rate)    The patient has the following additional risks for perioperative complications:  No identified additional risks      ICD-10-CM    1.  Preop general physical exam Z01.818    2. Cataract of both eyes, unspecified cataract type H26.9        RECOMMENDATIONS:       APPROVAL GIVEN to proceed with proposed procedure, without further diagnostic evaluation       Signed Electronically by: Deedee Wilde MD    Copy of this evaluation report is provided to requesting physician.    [facility] Preop Guidelines    Revised Cardiac Risk Index

## 2018-12-04 ENCOUNTER — OFFICE VISIT (OUTPATIENT)
Dept: FAMILY MEDICINE | Facility: OTHER | Age: 68
End: 2018-12-04
Payer: COMMERCIAL

## 2018-12-04 VITALS
DIASTOLIC BLOOD PRESSURE: 90 MMHG | RESPIRATION RATE: 14 BRPM | OXYGEN SATURATION: 96 % | TEMPERATURE: 98.3 F | SYSTOLIC BLOOD PRESSURE: 152 MMHG | BODY MASS INDEX: 30.51 KG/M2 | WEIGHT: 206 LBS | HEART RATE: 79 BPM

## 2018-12-04 DIAGNOSIS — H26.9 CATARACT OF BOTH EYES, UNSPECIFIED CATARACT TYPE: ICD-10-CM

## 2018-12-04 DIAGNOSIS — Z01.818 PREOP GENERAL PHYSICAL EXAM: Primary | ICD-10-CM

## 2018-12-04 PROCEDURE — 99214 OFFICE O/P EST MOD 30 MIN: CPT | Performed by: FAMILY MEDICINE

## 2018-12-04 RX ORDER — DOCUSATE SODIUM 100 MG/1
100 CAPSULE, LIQUID FILLED ORAL 2 TIMES DAILY
COMMUNITY
End: 2019-06-21

## 2018-12-04 NOTE — MR AVS SNAPSHOT
After Visit Summary   12/4/2018    Marlene Khan    MRN: [id number]           Patient Information     Date Of Birth          1950        Visit Information        Provider Department      12/4/2018 9:40 AM Deedee Wilde MD [facility]        Today's Diagnoses     Preop general physical exam    -  1    Cataract of both eyes, unspecified cataract type          Care Instructions    Stop in at our [place] pharmacy to get your blood pressure rechecked.    Before Your Surgery      Call your surgeon if there is any change in your health. This includes signs of a cold or flu (such as a sore throat, runny nose, cough, rash or fever).    Do not smoke, drink alcohol or take over the counter medicine (unless your surgeon or primary care doctor tells you to) for the 24 hours before and after surgery.    If you take prescribed drugs: Follow your doctor s orders about which medicines to take and which to stop until after surgery.    Eating and drinking prior to surgery: follow the instructions from your surgeon    Take a shower or bath the night before surgery. Use the soap your surgeon gave you to gently clean your skin. If you do not have soap from your surgeon, use your regular soap. Do not shave or scrub the surgery site.  Wear clean pajamas and have clean sheets on your bed.           Follow-ups after your visit        Who to contact     If you have questions or need follow up information about today's clinic visit or your schedule please contact [facility] directly at [phone].  Normal or non-critical lab and imaging results will be communicated to you by MyChart, letter or phone within 4 business days after the clinic has received the results. If you do not hear from us within 7 days, please contact the clinic through MyChart or phone. If you have a critical or abnormal lab result, we will notify you by phone as soon as possible.  Submit refill requests  through [employer] or call your pharmacy and they will forward the refill request to us. Please allow 3 business days for your refill to be completed.          Additional Information About Your Visit        [email or web address] Information     [employer] gives you secure access to your electronic health record. If you see a primary care provider, you can also send messages to your care team and make appointments. If you have questions, please call your primary care clinic.  If you do not have a primary care provider, please call [phone] and they will assist you.        Care EveryWhere ID     This is your Care EveryWhere ID. This could be used by other organizations to access your [facility] medical records  [id number]        Your Vitals Were     Pulse Temperature Respirations Pulse Oximetry BMI (Body Mass Index)       79 98.3  F (36.8  C) (Temporal) 14 96% 30.51 kg/m2        Blood Pressure from Last 3 Encounters:   12/04/18 152/90   11/02/18 136/80   05/21/18 134/78    Weight from Last 3 Encounters:   12/04/18 206 lb (93.4 kg)   11/02/18 206 lb (93.4 kg)   05/21/18 211 lb 3.2 oz (95.8 kg)              Today, you had the following     No orders found for display       Primary Care Provider Office Phone # Fax #    Deedee ESTEVES MD [facility] [phone]       [address] [place] [id number]        Equal Access to Services     [facility] : Hadii joshua shaho Soyaya, waaxda luqadaha, qaybta kaalmada mandie, marlys ghotra . So [facility] [phone].    ATENCIÓN: Si habla español, tiene a jacob disposición servicios gratuitos de asistencia lingüística. Gloria al [phone].    We comply with applicable federal civil rights laws and [place] laws. We do not discriminate on the basis of race, color, national origin, age, disability, sex, sexual orientation, or gender identity.            Thank you!     Thank you for choosing [facility]  for your care. Our goal is always  to provide you with excellent care. Hearing back from our patients is one way we can continue to improve our services. Please take a few minutes to complete the written survey that you may receive in the mail after your visit with us. Thank you!             Your Updated Medication List - Protect others around you: Learn how to safely use, store and throw away your medicines at www.disposemymeds.org.          This list is accurate as of 12/4/18 10:11 AM.  Always use your most recent med list.                   Brand Name Dispense Instructions for use Diagnosis    CALCIUM-VITAMIN D PO      Take 2 tablets by mouth 2 times daily.        Cranberry 300 MG Tabs      Take 1 tablet by mouth 2 times daily.        docusate sodium 100 MG capsule    COLACE     Take 100 mg by mouth 2 times daily        doxycycline hyclate 50 MG capsule    VIBRAMYCIN    90 capsule    TAKE 1 CAPSULE (50 MG) BY MOUTH DAILY AS NEEDED    Rosacea       multivitamin Tabs tablet      Take 1 tablet by mouth daily

## 2018-12-05 ASSESSMENT — ASTHMA QUESTIONNAIRES: ACT_TOTALSCORE: 22

## 2019-02-21 DIAGNOSIS — L71.9 ROSACEA: ICD-10-CM

## 2019-02-21 RX ORDER — DOXYCYCLINE HYCLATE 50 MG/1
CAPSULE ORAL
Qty: 90 CAPSULE | Refills: 0 | OUTPATIENT
Start: 2019-02-21

## 2019-02-21 NOTE — TELEPHONE ENCOUNTER
Was sent to be used as needed for flares. She was given 90 to take 1 daily as needed, so would have had to use daily and not as needed for flares to need refill already. Please direct to appt if still having issues she feels are uncontrolled. TC /SHERON patient.  Karis Cabrales MD

## 2019-02-21 NOTE — TELEPHONE ENCOUNTER
Requested Prescriptions   Pending Prescriptions Disp Refills     doxycycline hyclate (VIBRAMYCIN) 50 MG capsule [Pharmacy Med Name: DOXYCYCLINE HYC 50MG CAP] 90 capsule 0     Sig: TAKE 1 CAPSULE BY MOUTH ONCE DAILY AS NEEDED    There is no refill protocol information for this order        doxycycline (VIBRAMYCIN) 50 MG capsule      Last Written Prescription Date:  11/02/2018  Last Fill Quantity: 90,   # refills: 0  Last Office Visit: 12/04/2018  Future Office visit:       Routing refill request to provider for review/approval because:  Drug not on the [facility], [facility] or [facility] refill protocol or controlled substance  Symone Russo RN, BSN

## 2019-02-22 NOTE — TELEPHONE ENCOUNTER
Called and spoke with patient regarding message below.  Patient verbalized understanding.  Scheduled patient with TC on 3/5/19.  Natalie Woods CMA ([facility])

## 2019-02-27 NOTE — PROGRESS NOTES
SUBJECTIVE:   Marlene Khan is a 68 year old female who presents to clinic today for the following health issues:    History of Present Illness   Frequency of exercise:  1 day/week  Duration of exercise:  15-30 minutes  Taking medications regularly:  Yes  Medication side effects:  Not applicable  Additional concerns today:  Yes      Medication Followup of Doxycycline for Rosacea     Taking Medication as prescribed: yes    Side Effects:  None    Medication Helping Symptoms:  yes     Metrogel was prescribed, but not picked up due to cost.  So she is using doxycycline instead.    Problem list and histories reviewed & adjusted, as indicated.  Additional history: as documented    Patient Active Problem List   Diagnosis     Persistent disorder of initiating or maintaining sleep     Phobia     Lobular carcinoma in situ     Intermittent asthma     Advanced directives, counseling/discussion     Rosacea     DCIS (ductal carcinoma in situ) of breast     Past Surgical History:   Procedure Laterality Date     BIOPSY BREAST NEEDLE LOCALIZATION  12/2/2010    BIOPSY BREAST NEEDLE LOCALIZATION performed by CHATA DELACRUZ at  OR     BREAST LUMPECTOMY, RT/LT  12/2/2010    Breast Lumpectomy RT/LT followed by [facility]     C TOTAL ABDOM HYSTERECTOMY  11/24/00    Hysterectomy, Total Abdominal, lysis of adhesions     HC COLONOSCOPY W/WO BRUSH/WASH  08/03/00     HC REMOVAL OF OVARY/TUBE(S)  11/24/00    Salpingo-Oophorectomy, bilateral     HC REPAIR OF NASAL SEPTUM      correction of deviated nasal septum       Social History     Tobacco Use     Smoking status: Never Smoker     Smokeless tobacco: Never Used     Tobacco comment: no smokers in household   Substance Use Topics     Alcohol use: Yes     Comment: Approx 3 drinks q week     Family History   Problem Relation Age of Onset     Arthritis Mother      Hypertension Mother      Gastrointestinal Disease Mother         GERD     Prostate Cancer Father      Blood Disease  "Father         unknown what type at this point.     Prostate Cancer Brother      Cancer Maternal Aunt         breast cancer diagnosed in 50s           ROS:  CONSTITUTIONAL: NEGATIVE for fever, chills, change in weight  ENT/MOUTH: NEGATIVE for ear, mouth and throat problems  RESP: NEGATIVE for significant cough or shortness of breath   CV: NEGATIVE for chest pain, palpitations or peripheral edema    OBJECTIVE:     /88 (BP Location: Right arm, Patient Position: Chair, Cuff Size: Adult Large)   Pulse 85   Temp 98.4  F (36.9  C) (Temporal)   Resp 16   Ht 1.74 m (5' 8.5\")   Wt 93.9 kg (207 lb)   SpO2 96%   BMI 31.02 kg/m    Body mass index is 31.02 kg/m .  Gen: no apparent distress  Skin: telangtasias and erythema that lizy with palpation on the cheeks and nose.   Psych: Alert and oriented times 3; coherent speech, normal   rate and volume, able to articulate logical thoughts, able   to abstract reason, no tangential thoughts, no hallucinations   or delusions  Her affect is neutral.    ASSESSMENT/PLAN:     1. Rosacea  Will continue doxycyline, but will try topical to see if helps with control.    - doxycycline hyclate (VIBRAMYCIN) 50 MG capsule; TAKE 1 CAPSULE (50 MG) BY MOUTH DAILY AS NEEDED  Dispense: 90 capsule; Refill: 3  - metroNIDAZOLE (METROGEL) 0.75 % external gel; Apply topically 2 times daily  Dispense: 45 g; Refill: 11    2. Special screening for malignant neoplasms, colon    - GASTROENTEROLOGY ADULT REF PROCEDURE ONLY      Deeede Wilde MD  [facility]  "

## 2019-03-05 ENCOUNTER — OFFICE VISIT (OUTPATIENT)
Dept: FAMILY MEDICINE | Facility: OTHER | Age: 69
End: 2019-03-05
Payer: MEDICARE

## 2019-03-05 VITALS
HEART RATE: 85 BPM | BODY MASS INDEX: 30.66 KG/M2 | TEMPERATURE: 98.4 F | DIASTOLIC BLOOD PRESSURE: 88 MMHG | HEIGHT: 69 IN | WEIGHT: 207 LBS | OXYGEN SATURATION: 96 % | SYSTOLIC BLOOD PRESSURE: 148 MMHG | RESPIRATION RATE: 16 BRPM

## 2019-03-05 DIAGNOSIS — Z12.11 SPECIAL SCREENING FOR MALIGNANT NEOPLASMS, COLON: ICD-10-CM

## 2019-03-05 DIAGNOSIS — L71.9 ROSACEA: Primary | ICD-10-CM

## 2019-03-05 PROCEDURE — 99213 OFFICE O/P EST LOW 20 MIN: CPT | Performed by: FAMILY MEDICINE

## 2019-03-05 RX ORDER — METRONIDAZOLE 7.5 MG/G
GEL TOPICAL 2 TIMES DAILY
Qty: 45 G | Refills: 11 | Status: SHIPPED | OUTPATIENT
Start: 2019-03-05 | End: 2023-11-09

## 2019-03-05 RX ORDER — DOXYCYCLINE HYCLATE 50 MG/1
CAPSULE ORAL
Qty: 90 CAPSULE | Refills: 3 | Status: SHIPPED | OUTPATIENT
Start: 2019-03-05 | End: 2019-09-10

## 2019-03-05 RX ORDER — MULTIVIT WITH MINERALS/LUTEIN
1 TABLET ORAL DAILY
COMMUNITY

## 2019-03-05 ASSESSMENT — MIFFLIN-ST. JEOR: SCORE: 1530.39

## 2019-03-05 NOTE — PATIENT INSTRUCTIONS
Your blood pressure has remained elevated.  Let's check it a few more times at our pharmacy.  If it remains >140/90 when you aren't stressed, then make an appointment and we'll talk medication.      Patient Education     Rosacea  Rosacea is a long-lasting (chronic) skin condition affecting the face. In the early stages it causes easy flushing or blushing. Redness may become long-term (permanent) as the small blood vessels of the face widen (dilate). There may be small, red, pus-filled bumps (pustules). It looks like a bad case of acne, and has been called adult acne. But it is not caused by the same things that cause acne.  Rosacea is a chronic illness. You will have flare-ups that come and go. This may happen every few weeks or every few months. Experts don't know what causes rosacea. If not treated, it tends to get worse over time.  Some men with a more severe form of rosacea develop a condition called rhinophyma. The oil glands on the skin of the nose become blocked and the nose gets bigger. The cheeks also become puffy. Alcohol may increase the flushing. But this condition is not caused by alcohol use.  Rosacea can be treated with topical gels and creams. Oral antibiotics are used for more severe cases. You should see improvement in the first 4 weeks. Dilated blood vessels can be treated with a small electric needle or laser surgery. Rhinophyma can be treated with surgery. Or it may be treated by surgically scraping the skin (dermabrasion).  Home care    Avoid things that make your face red or flushed. These include hot drinks, spicy foods, caffeine, and alcohol.    Exercise indoors or in a cool area to avoid getting overheated.    Avoid excess sun exposure. Use a sunscreen with at least SPF 15 or higher.    Avoid extreme hot or extreme cold weather.    Don't scrub your face. That will irritate the skin and increase redness.    Avoid harsh soaps or moisturizers with irritating ingredients. You may need to use  hypoallergenic cosmetics.    Avoid over-the-counter treatments unless instructed by your healthcare provider. Some of these treatments may make rosacea worse.    Try to figure out what triggers your flares (such as stress, sun exposure, or certain foods).  Follow-up care  Follow up with your healthcare provider, or as advised. Contact your provider if your condition is not responding to the medicines you were given. Getting treatment early can stop it from getting worse.  When to seek medical advice  Call your healthcare provider right away if you have redness, burning, or a gritty feeling in your eyes.  Date Last Reviewed: 8/1/2016 2000-2018 "[employer]". [address] [place] [address], [place] [address]. All rights reserved. This information is not intended as a substitute for professional medical care. Always follow your healthcare professional's instructions.

## 2019-03-06 ENCOUNTER — TELEPHONE (OUTPATIENT)
Dept: FAMILY MEDICINE | Facility: OTHER | Age: 69
End: 2019-03-06

## 2019-03-06 ASSESSMENT — ASTHMA QUESTIONNAIRES: ACT_TOTALSCORE: 23

## 2019-03-06 NOTE — TELEPHONE ENCOUNTER
Spoke to patient and she is going to call her insurance and check on a few things. She will call back when she is ready.

## 2019-03-27 ENCOUNTER — TELEPHONE (OUTPATIENT)
Dept: FAMILY MEDICINE | Facility: OTHER | Age: 69
End: 2019-03-27

## 2019-03-27 NOTE — TELEPHONE ENCOUNTER
Please abstract the following data from this visit with this patient into the appropriate field in Epic:    Mammogram done on this date: 11/05/2018 (approximately), by this group: [facility], results in care everywhere.     Berta Mirza    Summary:    Patient is due/failing the following:   MAMMOGRAM    Action needed:   Schedule a mammogram     Type of outreach:    none per care everywhere UTD    Questions for provider review:    None                                                                                                                                    Berta Mirza     Chart routed to Care Team .      Panel Management Review      Patient has the following on her problem list:     Asthma review     ACT Total Scores 3/5/2019   ACT TOTAL SCORE -   ASTHMA ER VISITS -   ASTHMA HOSPITALIZATIONS -   ACT TOTAL SCORE (Goal Greater than or Equal to 20) 23   In the past 12 months, how many times did you visit the emergency room for your asthma without being admitted to the hospital? 0   In the past 12 months, how many times were you hospitalized overnight because of your asthma? 0      1. Is Asthma diagnosis on the Problem List? Yes    2. Is Asthma listed on Health Maintenance? Yes    3. Patient is due for:  NONE     Composite cancer screening  Chart review shows that this patient is due/due soon for the following Mammogram

## 2019-06-03 ENCOUNTER — MYC MEDICAL ADVICE (OUTPATIENT)
Dept: FAMILY MEDICINE | Facility: OTHER | Age: 69
End: 2019-06-03

## 2019-06-03 NOTE — TELEPHONE ENCOUNTER
"Pasted from other MyChart: \"I believe that Finacea now has a generic for Rosacea which is what I would be interested in trying.  I would appreciate your thoughts.   Thank you,   Raquel Khan \"  "

## 2019-06-04 NOTE — TELEPHONE ENCOUNTER
I don't know what preps are offered by the facilities.  If someone could call and ask.    As for changing medications--this should be E-visit, telephone visit or office visit.

## 2019-06-18 NOTE — PROGRESS NOTES
"Subjective     Marlene Khan is a 68 year old female who presents to clinic today for the following health issues:    History of Present Illness        She eats 4 or more servings of fruits and vegetables daily.She consumes 1 sweetened beverage(s) daily.She is missing 2 dose(s) of medications per week.  She is not taking prescribed medications regularly due to other.     Medication Followup of Metrogel     Taking Medication as prescribed: NO-Patient stopped taking the medication, because she felt like it wasn't working well for her and it caused more breakouts.     Side Effects:  None    Medication Helping Symptoms:  NO    Patient went back on the Doxycycline tablet, which has helped. Patient would like to discuss starting Finacea.      Annual Wellness Visit    Are you in the first 12 months of your Medicare Part B coverage?  No    Physical Health:    In general, how would you rate your overall physical health? good    If you drink alcohol do you typically have >3 drinks per day or >7 drinks per week? No    Do you usually eat at least 4 servings of fruit and vegetables a day, include whole grains & fiber and avoid regularly eating high fat or \"junk\" foods? Yes    Needs assistance for the following daily activities: no assistance needed    Which of the following safety concerns are present in your home?  none identified     Hearing impairment: No    In the past 6 months, have you been bothered by leaking of urine? no    Mental Health:    In general, how would you rate your overall mental or emotional health? good  PHQ-2 Score: (P) 0    Do you feel safe in your environment? Yes    Do you have a Health Care Directive? No: Advance care planning was reviewed with patient; patient declined at this time.    Fall risk:  Fallen 2 or more times in the past year?: No  Any fall with injury in the past year?: No    Cognitive Screenin) Repeat 3 items (Leader, Season, Table)    2) Clock draw: NORMAL  3) 3 item recall: " Recalls 3 objects  Results: 3 items recalled: COGNITIVE IMPAIRMENT LESS LIKELY    Mini-CogTM Copyright S Louise. Licensed by the author for use in [facility]; reprinted with permission (jhonathan@[facility]). All rights reserved.      Current providers sharing in care for this patient include:   Patient Care Team:  Deedee Wilde MD as PCP - General  Deedee Wilde MD as Assigned PCP      Patient Active Problem List   Diagnosis     Persistent disorder of initiating or maintaining sleep     Phobia     Lobular carcinoma in situ     Intermittent asthma     Advanced directives, counseling/discussion     Rosacea     Past Surgical History:   Procedure Laterality Date     BIOPSY BREAST NEEDLE LOCALIZATION  12/2/2010    BIOPSY BREAST NEEDLE LOCALIZATION performed by CHATA DELACRUZ at  OR     BREAST LUMPECTOMY, RT/LT  12/2/2010    Breast Lumpectomy RT/LT followed by [facility]     C TOTAL ABDOM HYSTERECTOMY  11/24/00    Hysterectomy, Total Abdominal, lysis of adhesions     HC COLONOSCOPY W/WO BRUSH/WASH  08/03/00     HC REMOVAL OF OVARY/TUBE(S)  11/24/00    Salpingo-Oophorectomy, bilateral     HC REPAIR OF NASAL SEPTUM      correction of deviated nasal septum       Social History     Tobacco Use     Smoking status: Never Smoker     Smokeless tobacco: Never Used     Tobacco comment: no smokers in household   Substance Use Topics     Alcohol use: Yes     Comment: Approx 3 drinks q week     Family History   Problem Relation Age of Onset     Arthritis Mother      Hypertension Mother      Gastrointestinal Disease Mother         GERD     Prostate Cancer Father      Blood Disease Father         unknown what type at this point.     Prostate Cancer Brother      Cancer Maternal Aunt         breast cancer diagnosed in 50s           Reviewed and updated as needed this visit by Provider  Allergies  Meds  Problems         Review of Systems   ROS COMP: CONSTITUTIONAL: NEGATIVE for fever, chills, change in  "weight  ENT/MOUTH: NEGATIVE for ear, mouth and throat problems  RESP: NEGATIVE for significant cough or shortness of breath  CV: NEGATIVE for chest pain, palpitations or peripheral edema      Objective    /72 (BP Location: Left leg, Patient Position: Sitting, Cuff Size: Adult Regular)   Pulse 86   Temp 97  F (36.1  C) (Temporal)   Resp 16   Ht 1.74 m (5' 8.5\")   Wt 95.7 kg (211 lb)   BMI 31.62 kg/m    Body mass index is 31.62 kg/m .  Physical Exam   Constitutional: She is oriented to person, place, and time. She appears well-developed and well-nourished. No distress.   HENT:   Right Ear: Tympanic membrane and external ear normal.   Left Ear: Tympanic membrane and external ear normal.   Nose: Nose normal.   Mouth/Throat: Oropharynx is clear and moist. No oropharyngeal exudate.   Eyes: Pupils are equal, round, and reactive to light. Conjunctivae are normal. Right eye exhibits no discharge. Left eye exhibits no discharge.   Neck: Neck supple. No tracheal deviation present. No thyromegaly present.   Cardiovascular: Normal rate, regular rhythm, S1 normal, S2 normal, normal heart sounds and normal pulses. Exam reveals no S3, no S4 and no friction rub.   No murmur heard.  Pulmonary/Chest: Effort normal and breath sounds normal. No respiratory distress. She has no wheezes. She has no rales.   Abdominal: Soft. Bowel sounds are normal. She exhibits no mass. There is no hepatosplenomegaly. There is no tenderness.   Musculoskeletal: Normal range of motion. She exhibits no edema.   Lymphadenopathy:     She has no cervical adenopathy.   Neurological: She is alert and oriented to person, place, and time. She has normal strength and normal reflexes. She exhibits normal muscle tone.   Skin: Skin is warm and dry.    Vague telangtasias and erythema that lizy with palpation on the cheeks and nose.    Psychiatric: Judgment and thought content normal. Her mood appears anxious. Cognition and memory are normal.        " "    Assessment & Plan     1. Encounter for Medicare annual wellness exam      2. Rosacea  She does not feel the MetroGel worked.  She restarted her doxycycline and feels this works much better but she is concerned about the side effects of long-term antibiotics.  She is especially concerned about her gut health and we discussed adding probiotics.  She has looked at using azelaic acid but is concerned about the price.  Will refer to dermatology for her to discuss other options.    - DERMATOLOGY REFERRAL    3. Special screening for malignant neoplasms, colon    - GASTROENTEROLOGY ADULT REF PROCEDURE ONLY [facility] [phone]     BMI:   Estimated body mass index is 31.62 kg/m  as calculated from the following:    Height as of this encounter: 1.74 m (5' 8.5\").    Weight as of this encounter: 95.7 kg (211 lb).   Weight management plan: Discussed healthy diet and exercise guidelines    Return in about 53 weeks (around 6/26/2020) for Annual Wellness Visit.    Deedee Wilde MD  [facility]    "

## 2019-06-21 ENCOUNTER — OFFICE VISIT (OUTPATIENT)
Dept: FAMILY MEDICINE | Facility: OTHER | Age: 69
End: 2019-06-21
Payer: MEDICARE

## 2019-06-21 VITALS
TEMPERATURE: 97 F | SYSTOLIC BLOOD PRESSURE: 134 MMHG | DIASTOLIC BLOOD PRESSURE: 72 MMHG | RESPIRATION RATE: 16 BRPM | BODY MASS INDEX: 31.25 KG/M2 | HEART RATE: 86 BPM | WEIGHT: 211 LBS | HEIGHT: 69 IN

## 2019-06-21 DIAGNOSIS — Z12.11 SPECIAL SCREENING FOR MALIGNANT NEOPLASMS, COLON: ICD-10-CM

## 2019-06-21 DIAGNOSIS — Z00.00 ENCOUNTER FOR MEDICARE ANNUAL WELLNESS EXAM: Primary | ICD-10-CM

## 2019-06-21 DIAGNOSIS — L71.9 ROSACEA: ICD-10-CM

## 2019-06-21 PROCEDURE — G0438 PPPS, INITIAL VISIT: HCPCS | Performed by: FAMILY MEDICINE

## 2019-06-21 RX ORDER — CETIRIZINE HYDROCHLORIDE 10 MG/1
10 TABLET ORAL DAILY PRN
COMMUNITY

## 2019-06-21 ASSESSMENT — PAIN SCALES - GENERAL: PAINLEVEL: MODERATE PAIN (4)

## 2019-06-21 ASSESSMENT — MIFFLIN-ST. JEOR: SCORE: 1548.53

## 2019-06-21 NOTE — PATIENT INSTRUCTIONS
Patient Education   Personalized Prevention Plan  You are due for the preventive services outlined below.  Your care team is available to assist you in scheduling these services.  If you have already completed any of these items, please share that information with your care team to update in your medical record.  Health Maintenance Due   Topic Date Due     Colonscopy  08/03/2015     Asthma Action Plan - yearly  06/03/2016     Annual Wellness Visit  12/01/2016     Osteoporosis Screening  04/09/2019

## 2019-06-22 ASSESSMENT — ASTHMA QUESTIONNAIRES: ACT_TOTALSCORE: 24

## 2019-06-24 ENCOUNTER — TELEPHONE (OUTPATIENT)
Dept: FAMILY MEDICINE | Facility: OTHER | Age: 69
End: 2019-06-24

## 2019-06-24 NOTE — TELEPHONE ENCOUNTER
Left message for patient to return call to schedule colonoscopy or EGD. If Denise or Cheri are unavailable, please transfer to the surgery center.

## 2019-06-24 NOTE — LETTER
[facility] [address] [address]  [phone]      June 26, 2019      Marlene Khan  [address] [facility] [address]      Dear Marlene:     To better serve you, we are sending this letter to notify you that we have attempted to contact you by telephone to schedule the following procedure(s) ordered by your physician.     __x_____   Colonoscopy   _______   Upper GI Endoscopy (EGD)   _______   Colonoscopy and Upper GI Endoscopy    To provide the highest quality of care, we strongly encourage you to call and schedule the prescribed test/procedure at your earliest convenience.   The number to the Specialty Scheduling department is [phone] and the hours are 8:00am - 4:30pm Monday through Friday.   We look forward to hearing from you.    Sincerely,    [facility]

## 2019-06-26 NOTE — TELEPHONE ENCOUNTER
Left message for patient to return call to schedule EGD/colonoscopy. If Cheri or Denise are not available, please transfer to same day surgery        x3 letter mailed

## 2019-06-28 ENCOUNTER — TELEPHONE (OUTPATIENT)
Dept: FAMILY MEDICINE | Facility: OTHER | Age: 69
End: 2019-06-28

## 2019-07-02 ENCOUNTER — MYC MEDICAL ADVICE (OUTPATIENT)
Dept: FAMILY MEDICINE | Facility: OTHER | Age: 69
End: 2019-07-02

## 2019-07-02 DIAGNOSIS — L71.9 ROSACEA: Primary | ICD-10-CM

## 2019-07-02 RX ORDER — AZELAIC ACID 0.15 G/G
GEL TOPICAL 2 TIMES DAILY
Qty: 50 G | Refills: 0 | Status: SHIPPED | OUTPATIENT
Start: 2019-07-02 | End: 2022-11-03

## 2019-08-06 ENCOUNTER — TELEPHONE (OUTPATIENT)
Dept: DERMATOLOGY | Facility: CLINIC | Age: 69
End: 2019-08-06

## 2019-08-06 NOTE — TELEPHONE ENCOUNTER
Patient on waitlist.  Left message for patient to call [phone] to move appointment up if wanted.  Appointments available on 8-16-19 with Dr. Rao    Has patient seen a dermatologist in the past?  Records?    Jinny Espinal, CMA

## 2019-09-10 ENCOUNTER — OFFICE VISIT (OUTPATIENT)
Dept: DERMATOLOGY | Facility: CLINIC | Age: 69
End: 2019-09-10
Payer: MEDICARE

## 2019-09-10 DIAGNOSIS — L71.9 ROSACEA: Primary | ICD-10-CM

## 2019-09-10 PROCEDURE — 99202 OFFICE O/P NEW SF 15 MIN: CPT | Performed by: DERMATOLOGY

## 2019-09-10 RX ORDER — DOXYCYCLINE 100 MG/1
100 CAPSULE ORAL 2 TIMES DAILY
Qty: 60 CAPSULE | Refills: 2 | Status: SHIPPED | OUTPATIENT
Start: 2019-09-10 | End: 2019-12-16

## 2019-09-10 RX ORDER — PERMETHRIN 50 MG/G
CREAM TOPICAL
Qty: 60 G | Refills: 11 | Status: SHIPPED | OUTPATIENT
Start: 2019-09-10 | End: 2022-07-05

## 2019-09-10 ASSESSMENT — PAIN SCALES - GENERAL: PAINLEVEL: NO PAIN (0)

## 2019-09-10 NOTE — LETTER
"    9/10/2019         RE: Marlene Khan  [address] [facility] [address]        Dear Colleague,    Thank you for referring your patient, Marlene Khan, to the [facility]. Please see a copy of my visit note below.    [facility] Dermatology Note    Dermatology Problem List:  1. Rosacea, papular  -current t/x: Metrogel 0.75% gel, Azelaic Acid 15%, Permethrin cream, doxycycline 100 mg BID x3 months  -s/p doxycycline 50 mg daily for years - flared when attempted to stop    Encounter Date: Sep 10, 2019    CC:  Chief Complaint   Patient presents with     Rosacea     currently using doxy 50mg daily, metrogel 0.75% and acetic acid 15% gel     History of Present Illness:  Ms. Rosario \"BUTCH\" NICK Khan is a 68 year old female who presents as a referral from Deedee Wilde MD for rosacea. She has never been seen at our clinic prior to today's visit. She notes that she first began experiencing rosacea around 2013. It has gradually worsened since diagnosis. In 2013, she was treated with doxycycline for a short time. She tried metronidazole gel or cream for a short period of time then and did not find it helpful. She tried metronidazole for about a month recently and again did not find it helpful. Topically, she is currently using Azelaic Acid 15% without much improvement in pimple like bumps. She does feel this helps with the redness. She is taking doxycycline 50mg daily. She has been on this for quite some time. Her skin flares whenever she stops it. She stopped it one month ago in preparation for this visit. When asked about triggers, she notes that exposure to sun will worsen the condition. She currently using Cetaphil cleansers and other facial moisturizers that seem to worsen the condition. Today, she is worried about the possibility of ocular rosacea and is wondering if there are any more extensive treatment options available. She notes she had dry eye symptoms " occur after a recent cataract surgery, but this seems to be getting better on its own now. She is particularly concerned about the long term risks of being on an oral antibiotic. She has no personal history of skin cancer. No other concerns addressed today.    Past Medical History:   Patient Active Problem List   Diagnosis     Persistent disorder of initiating or maintaining sleep     Phobia     Lobular carcinoma in situ     Intermittent asthma     Advanced directives, counseling/discussion     Rosacea     Past Medical History:   Diagnosis Date     Abnormal Papanicolaou smear of cervix and cervical HPV     Abn. Pap smear (cervix), age 24  s/p cryotherapy     Anxiety state, unspecified     anxiety with public speaking     Cough      Excessive or frequent menstruation      Other congenital anomaly of uterus     uterine fibroids     Rosacea 8/16/2011     Unspecified sinusitis (chronic)     Chronic sinusitis     Past Surgical History:   Procedure Laterality Date     BIOPSY BREAST NEEDLE LOCALIZATION  12/2/2010    BIOPSY BREAST NEEDLE LOCALIZATION performed by CHATA DELACRUZ at  OR     BREAST LUMPECTOMY, RT/LT  12/2/2010    Breast Lumpectomy RT/LT followed by [facility]     C TOTAL ABDOM HYSTERECTOMY  11/24/00    Hysterectomy, Total Abdominal, lysis of adhesions     HC COLONOSCOPY W/WO BRUSH/WASH  08/03/00     HC REMOVAL OF OVARY/TUBE(S)  11/24/00    Salpingo-Oophorectomy, bilateral     HC REPAIR OF NASAL SEPTUM      correction of deviated nasal septum     Social History:  Patient is retired    Family History:  BCC in her mother and aunt    Medications:  Current Outpatient Medications   Medication Sig Dispense Refill     azelaic acid (FINACIA) 15 % external gel Apply topically 2 times daily 50 g 0     CALCIUM-VITAMIN D PO Take 2 tablets by mouth 2 times daily.       cetirizine (ZYRTEC) 10 MG tablet Take 10 mg by mouth daily as needed       Cranberry 300 MG TABS Take 1 tablet by mouth 2 times daily.        doxycycline hyclate (VIBRAMYCIN) 50 MG capsule TAKE 1 CAPSULE (50 MG) BY MOUTH DAILY AS NEEDED 90 capsule 3     metroNIDAZOLE (METROGEL) 0.75 % external gel Apply topically 2 times daily 45 g 11     multivitamin (CENTRUM SILVER) tablet Take 1 tablet by mouth daily       multivitamin (OCUVITE) TABS Take 1 tablet by mouth daily         Allergies   Allergen Reactions     Augmentin      Latex      Sulfa Drugs      Review of Systems:  -Constitutional: Patient is otherwise feeling well, in usual state of health.   -Skin: As above in HPI. No additional skin concerns.    Physical exam:  Vitals: There were no vitals taken for this visit.  GEN: This is a well developed, well-nourished female in no acute distress, in a pleasant mood.    SKIN: Sun-exposed skin, which includes the head/face, neck, both arms, digits, and/or nails was examined.   - Barfield Type II  - Edematous red papules involving the glabella, nose, medial cheeks, and chin  - There are approximately 10 active lesions today  - Fine telangiectasias in these same areas   - No other lesions of concern on areas examined.     Impression/Plan:    1. Rosacea, granulomatous. Discussed the somewhat unknown pathogenesis of this condition. It is certainly multifactorial, and I think a treatment approach that hits multiple factors is most helpful. While oral antibiotics do certainly work for rosacea, our goal is always to taper off them for long term maintenance on topicals alone as the risks of long term oral antibiotic use are largely unknown. Some patients are unable to taper completely off of oral antibiotics, so then I will have them use either submicrobial dosed doxycycline or doxycycline 50/100mg a few times a week to minimize total drug exposure. Because Raquel's skin is not under good control with 50mg dosing of doxycycline, tapering off at this time would not be successful. I recommended she go to full dosing at 100mg BID for the next 3 months while allowing time  for topicals to take full affect. Recommended she use two topicals a day, rotating metronidazole 0.75% cream, azelaic acid 15% gel/cream, and permethrin 5% cream so she can get the benefits from each of these which have a different mechanism of action. She was initially hesitant to go up to 100mg BID dosing of the doxycycline, but when I explained that by treating at higher dose now that I think we will ultimately need less cumulative medication, she was willing to try.     I recommend Vanicream as a moisturizer as well as Vanicream gentle facial cleanser    Topicals: Metrogel 0.75% gel, Azelaic Acid 15%, and permethrin 5% cream. Instructed to use two topicals per day (morning and night). Advised patient to try to rotate topicals.     Will begin by perscribing doxycycline 100 mg BID x3 months, then will attempt to taper dose at follow up if clear. Reviewed side effects of this medication in detail. Gave handout on side effects.     Recommended ophthalmology evaluation for ocular rosacea.     CC Deedee Wilde MD on close of this encounter.  Follow-up in 3 months for rosacea, earlier for new or changing lesions.     Staff Involved:  Scribe/Staff    Scribe Disclosure  I, Dinah Florez, am serving as a scribe to document services personally performed by Dr. Candy Lugo MD, based on data collection and the provider's statements to me.     Provider Disclosure:   The documentation recorded by the scribe accurately reflects the services I personally performed and the decisions made by me.    Candy Lugo MD    Department of Dermatology  [facility]: Phone: [phone], Fax:[phone]  [facility]: Phone: [phone], Fax: [phone]                Again, thank you for allowing me to participate in the care of your patient.        Sincerely,        Candy Lugo MD

## 2019-09-10 NOTE — PROGRESS NOTES
"[facility] Dermatology Note    Dermatology Problem List:  1. Rosacea, papular  -current t/x: Metrogel 0.75% gel, Azelaic Acid 15%, Permethrin cream, doxycycline 100 mg BID x3 months  -s/p doxycycline 50 mg daily for years - flared when attempted to stop    Encounter Date: Sep 10, 2019    CC:  Chief Complaint   Patient presents with     Rosacea     currently using doxy 50mg daily, metrogel 0.75% and acetic acid 15% gel     History of Present Illness:  Ms. Rosario \"BUTCH\" NICK Khan is a 68 year old female who presents as a referral from Deedee Wilde MD for rosacea. She has never been seen at our clinic prior to today's visit. She notes that she first began experiencing rosacea around 2013. It has gradually worsened since diagnosis. In 2013, she was treated with doxycycline for a short time. She tried metronidazole gel or cream for a short period of time then and did not find it helpful. She tried metronidazole for about a month recently and again did not find it helpful. Topically, she is currently using Azelaic Acid 15% without much improvement in pimple like bumps. She does feel this helps with the redness. She is taking doxycycline 50mg daily. She has been on this for quite some time. Her skin flares whenever she stops it. She stopped it one month ago in preparation for this visit. When asked about triggers, she notes that exposure to sun will worsen the condition. She currently using Cetaphil cleansers and other facial moisturizers that seem to worsen the condition. Today, she is worried about the possibility of ocular rosacea and is wondering if there are any more extensive treatment options available. She notes she had dry eye symptoms occur after a recent cataract surgery, but this seems to be getting better on its own now. She is particularly concerned about the long term risks of being on an oral antibiotic. She has no personal history of skin cancer. No other concerns addressed " today.    Past Medical History:   Patient Active Problem List   Diagnosis     Persistent disorder of initiating or maintaining sleep     Phobia     Lobular carcinoma in situ     Intermittent asthma     Advanced directives, counseling/discussion     Rosacea     Past Medical History:   Diagnosis Date     Abnormal Papanicolaou smear of cervix and cervical HPV     Abn. Pap smear (cervix), age 24  s/p cryotherapy     Anxiety state, unspecified     anxiety with public speaking     Cough      Excessive or frequent menstruation      Other congenital anomaly of uterus     uterine fibroids     Rosacea 8/16/2011     Unspecified sinusitis (chronic)     Chronic sinusitis     Past Surgical History:   Procedure Laterality Date     BIOPSY BREAST NEEDLE LOCALIZATION  12/2/2010    BIOPSY BREAST NEEDLE LOCALIZATION performed by CHATA DELACRUZ at  OR     BREAST LUMPECTOMY, RT/LT  12/2/2010    Breast Lumpectomy RT/LT [facility] TOTAL ABDOM HYSTERECTOMY  11/24/00    Hysterectomy, Total Abdominal, lysis of adhesions     HC COLONOSCOPY W/WO BRUSH/WASH  08/03/00     HC REMOVAL OF OVARY/TUBE(S)  11/24/00    Salpingo-Oophorectomy, bilateral     HC REPAIR OF NASAL SEPTUM      correction of deviated nasal septum     Social History:  Patient is retired    Family History:  BCC in her mother and aunt    Medications:  Current Outpatient Medications   Medication Sig Dispense Refill     azelaic acid (FINACIA) 15 % external gel Apply topically 2 times daily 50 g 0     CALCIUM-VITAMIN D PO Take 2 tablets by mouth 2 times daily.       cetirizine (ZYRTEC) 10 MG tablet Take 10 mg by mouth daily as needed       Cranberry 300 MG TABS Take 1 tablet by mouth 2 times daily.       doxycycline hyclate (VIBRAMYCIN) 50 MG capsule TAKE 1 CAPSULE (50 MG) BY MOUTH DAILY AS NEEDED 90 capsule 3     metroNIDAZOLE (METROGEL) 0.75 % external gel Apply topically 2 times daily 45 g 11     multivitamin (CENTRUM SILVER) tablet Take 1 tablet by  mouth daily       multivitamin (OCUVITE) TABS Take 1 tablet by mouth daily         Allergies   Allergen Reactions     Augmentin      Latex      Sulfa Drugs      Review of Systems:  -Constitutional: Patient is otherwise feeling well, in usual state of health.   -Skin: As above in HPI. No additional skin concerns.    Physical exam:  Vitals: There were no vitals taken for this visit.  GEN: This is a well developed, well-nourished female in no acute distress, in a pleasant mood.    SKIN: Sun-exposed skin, which includes the head/face, neck, both arms, digits, and/or nails was examined.   - Barfield Type II  - Edematous red papules involving the glabella, nose, medial cheeks, and chin  - There are approximately 10 active lesions today  - Fine telangiectasias in these same areas   - No other lesions of concern on areas examined.     Impression/Plan:    1. Rosacea, granulomatous. Discussed the somewhat unknown pathogenesis of this condition. It is certainly multifactorial, and I think a treatment approach that hits multiple factors is most helpful. While oral antibiotics do certainly work for rosacea, our goal is always to taper off them for long term maintenance on topicals alone as the risks of long term oral antibiotic use are largely unknown. Some patients are unable to taper completely off of oral antibiotics, so then I will have them use either submicrobial dosed doxycycline or doxycycline 50/100mg a few times a week to minimize total drug exposure. Because Raquel's skin is not under good control with 50mg dosing of doxycycline, tapering off at this time would not be successful. I recommended she go to full dosing at 100mg BID for the next 3 months while allowing time for topicals to take full affect. Recommended she use two topicals a day, rotating metronidazole 0.75% cream, azelaic acid 15% gel/cream, and permethrin 5% cream so she can get the benefits from each of these which have a different mechanism of action.  She was initially hesitant to go up to 100mg BID dosing of the doxycycline, but when I explained that by treating at higher dose now that I think we will ultimately need less cumulative medication, she was willing to try.     I recommend Vanicream as a moisturizer as well as Vanicream gentle facial cleanser    Topicals: Metrogel 0.75% gel, Azelaic Acid 15%, and permethrin 5% cream. Instructed to use two topicals per day (morning and night). Advised patient to try to rotate topicals.     Will begin by perscribing doxycycline 100 mg BID x3 months, then will attempt to taper dose at follow up if clear. Reviewed side effects of this medication in detail. Gave handout on side effects.     Recommended ophthalmology evaluation for ocular rosacea.     CC Deedee Wilde MD on close of this encounter.  Follow-up in 3 months for rosacea, earlier for new or changing lesions.     Staff Involved:  Scribe/Staff    Scribe Disclosure  I, Dinah Florez, am serving as a scribe to document services personally performed by Dr. Candy Lugo MD, based on data collection and the provider's statements to me.     Provider Disclosure:   The documentation recorded by the scribe accurately reflects the services I personally performed and the decisions made by me.    Candy Lugo MD    Department of Dermatology  [facility]: Phone: [phone], Fax:[phone]  [facility]: Phone: [phone], Fax: [phone]

## 2019-09-10 NOTE — NURSING NOTE
@Marlene Khan's goals for this visit include:   Chief Complaint   Patient presents with     Rosacea     currently using doxy 50mg daily, metrogel 0.75% and acetic acid 15% gel       She requests these members of her care team be copied on today's visit information: NO    PCP: Deedee Wilde    Referring Provider:  Deedee Wilde MD  [address] [place], [place] [id number]    There were no vitals taken for this visit.    Do you need any medication refills at today's visit? NO    Jinny Espinal [facility]

## 2019-09-10 NOTE — PATIENT INSTRUCTIONS
1. For a liquid soap, I recommend Vanicream gentle facial cleanser.     2. Try to rotate the use of metronidazole 0.75% gel, Azelaic acid 15%, and permethrin cream each day. For example, use two topicals one day, a different combination of two the next, and so on. Apply the topical twice a day, everyday. You can find Azelaic acid on [employer] or [employer]. The brand The Ordinary is also sold at [facility].    3. I recommend a cream based moisturizer, like CeraVe or Vanicream, to be used twice a day.    4. Take doxycycline 100 mg twice a day for 3 months.       Gentle Skin Care  Below is a list of products our providers recommend for gentle skin care.  Moisturizers:    Lighter; Cetaphil Cream, CeraVe, Aveeno and Vanicream Light     Thicker; Aquaphor Ointment, Vaseline, Petrolium Jelly, Eucerin and Vanicream    Avoid Lotions (too thin)  Mild Cleansers:    Dove- Fragrance Free    CeraVe     Vanicream Cleansing Bar    Cetaphil Cleanser     Aquaphor 2 in1 Gentle Wash and Shampoo       Laundry Products:    All Free and Clear    Cheer Free    Generic Brands are okay as long as they are  Fragrance Free      Avoid fabric softeners  and dryer sheets   Sunscreens: SPF 30 or greater     Sunscreens that contain Zinc Oxide or Titanium Dioxide should be applied, these are physical blockers. Spray or  chemical  sunscreens should be avoided.        Shampoo and Conditioners:    Free and Clear by Vanicream    Aquaphor 2 in 1 Gentle Wash and Shampoo Oils:    Mineral Oil     Emu Oil     For some patients, coconut and sunflower seed oil      Generic Products are an okay substitute, but make sure they are fragrance free.  *Avoid product that have fragrance added to them. Organic does not mean  fragrance free.  In fact patients with sensitive skin can become quite irritated by organic products.     1. Daily bathing is recommended. Make sure you are applying a good moisturizer after bathing every time.  2. Use Moisturizing  creams at least twice daily to the whole body.   3. Creams are more moisturizing than lotions  4. Products should be fragrance free- soaps, creams, detergents.   Care Plan:  1. Keep bathing and showering short, less than 15 minutes   2. Always use lukewarm warm when possible. AVOID very HOT or COLD water  3. DO NOT use bubble bath  4. Limit the use of soaps. Focus on the skin folds, face, armpits, groin and feet  5. Do NOT vigorously scrub when you cleanse your skin  6. After bathing, PAT your skin lightly with a towel. DO NOT rub or scrub when drying  7. ALWAYS apply a moisturizer immediately after bathing. This helps to  lock in  the moisture.  8. Reapply moisturizing agents at least twice daily to your whole body  9. Do not use products such as powders, perfumes, or colognes on your skin  10. Avoid saunas and steam baths. This temperature is too HOT  11. Avoid tight or  scratchy  clothing such as wool  12. Always wash new clothing before wearing them for the first time  13. Sometimes a humidifier or vaporizer can be used at night can help the dry skin. Remember to keep it clean to avoid mold growth.

## 2019-09-17 ENCOUNTER — ANESTHESIA EVENT (OUTPATIENT)
Dept: GASTROENTEROLOGY | Facility: CLINIC | Age: 69
End: 2019-09-17
Payer: MEDICARE

## 2019-09-17 ENCOUNTER — HOSPITAL ENCOUNTER (OUTPATIENT)
Facility: CLINIC | Age: 69
Discharge: HOME | End: 2019-09-17
Attending: SURGERY | Admitting: SURGERY
Payer: MEDICARE

## 2019-09-17 ENCOUNTER — ANESTHESIA (OUTPATIENT)
Dept: GASTROENTEROLOGY | Facility: CLINIC | Age: 69
End: 2019-09-17
Payer: MEDICARE

## 2019-09-17 VITALS
DIASTOLIC BLOOD PRESSURE: 77 MMHG | OXYGEN SATURATION: 96 % | TEMPERATURE: 97.8 F | SYSTOLIC BLOOD PRESSURE: 131 MMHG | RESPIRATION RATE: 16 BRPM

## 2019-09-17 LAB — COLONOSCOPY: NORMAL

## 2019-09-17 PROCEDURE — 00000159 ZZHCL STATISTIC H-SEND OUTS PREP: Performed by: SURGERY

## 2019-09-17 PROCEDURE — 37000009 ZZH ANESTHESIA TECHNICAL FEE, EACH ADDTL 15 MIN: Performed by: SURGERY

## 2019-09-17 PROCEDURE — 88305 TISSUE EXAM BY PATHOLOGIST: CPT | Performed by: SURGERY

## 2019-09-17 PROCEDURE — 88305 TISSUE EXAM BY PATHOLOGIST: CPT | Mod: 26 | Performed by: SURGERY

## 2019-09-17 PROCEDURE — 25000125 ZZHC RX 250: Performed by: SURGERY

## 2019-09-17 PROCEDURE — 45381 COLONOSCOPY SUBMUCOUS NJX: CPT | Performed by: SURGERY

## 2019-09-17 PROCEDURE — 25000128 H RX IP 250 OP 636: Performed by: NURSE ANESTHETIST, CERTIFIED REGISTERED

## 2019-09-17 PROCEDURE — 45385 COLONOSCOPY W/LESION REMOVAL: CPT | Mod: PT | Performed by: SURGERY

## 2019-09-17 PROCEDURE — 37000008 ZZH ANESTHESIA TECHNICAL FEE, 1ST 30 MIN: Performed by: SURGERY

## 2019-09-17 PROCEDURE — 25800030 ZZH RX IP 258 OP 636: Performed by: NURSE ANESTHETIST, CERTIFIED REGISTERED

## 2019-09-17 PROCEDURE — 25000125 ZZHC RX 250: Performed by: NURSE ANESTHETIST, CERTIFIED REGISTERED

## 2019-09-17 PROCEDURE — 45381 COLONOSCOPY SUBMUCOUS NJX: CPT | Mod: PT | Performed by: SURGERY

## 2019-09-17 PROCEDURE — 45380 COLONOSCOPY AND BIOPSY: CPT | Performed by: SURGERY

## 2019-09-17 RX ORDER — ONDANSETRON 2 MG/ML
4 INJECTION INTRAMUSCULAR; INTRAVENOUS EVERY 6 HOURS PRN
Status: DISCONTINUED | OUTPATIENT
Start: 2019-09-17 | End: 2019-09-17 | Stop reason: HOSPADM

## 2019-09-17 RX ORDER — MEPERIDINE HYDROCHLORIDE 25 MG/ML
12.5 INJECTION INTRAMUSCULAR; INTRAVENOUS; SUBCUTANEOUS
Status: DISCONTINUED | OUTPATIENT
Start: 2019-09-17 | End: 2019-09-17 | Stop reason: HOSPADM

## 2019-09-17 RX ORDER — PROPOFOL 10 MG/ML
INJECTION, EMULSION INTRAVENOUS PRN
Status: DISCONTINUED | OUTPATIENT
Start: 2019-09-17 | End: 2019-09-17

## 2019-09-17 RX ORDER — FLUMAZENIL 0.1 MG/ML
0.2 INJECTION, SOLUTION INTRAVENOUS
Status: DISCONTINUED | OUTPATIENT
Start: 2019-09-17 | End: 2019-09-17 | Stop reason: HOSPADM

## 2019-09-17 RX ORDER — ONDANSETRON 2 MG/ML
4 INJECTION INTRAMUSCULAR; INTRAVENOUS EVERY 30 MIN PRN
Status: DISCONTINUED | OUTPATIENT
Start: 2019-09-17 | End: 2019-09-17

## 2019-09-17 RX ORDER — LIDOCAINE HYDROCHLORIDE 20 MG/ML
INJECTION, SOLUTION INFILTRATION; PERINEURAL PRN
Status: DISCONTINUED | OUTPATIENT
Start: 2019-09-17 | End: 2019-09-17

## 2019-09-17 RX ORDER — SODIUM CHLORIDE, SODIUM LACTATE, POTASSIUM CHLORIDE, CALCIUM CHLORIDE 600; 310; 30; 20 MG/100ML; MG/100ML; MG/100ML; MG/100ML
INJECTION, SOLUTION INTRAVENOUS CONTINUOUS
Status: DISCONTINUED | OUTPATIENT
Start: 2019-09-17 | End: 2019-09-17 | Stop reason: HOSPADM

## 2019-09-17 RX ORDER — NALOXONE HYDROCHLORIDE 0.4 MG/ML
.1-.4 INJECTION, SOLUTION INTRAMUSCULAR; INTRAVENOUS; SUBCUTANEOUS
Status: DISCONTINUED | OUTPATIENT
Start: 2019-09-17 | End: 2019-09-17 | Stop reason: HOSPADM

## 2019-09-17 RX ORDER — ONDANSETRON 4 MG/1
4 TABLET, ORALLY DISINTEGRATING ORAL EVERY 6 HOURS PRN
Status: DISCONTINUED | OUTPATIENT
Start: 2019-09-17 | End: 2019-09-17 | Stop reason: HOSPADM

## 2019-09-17 RX ORDER — ONDANSETRON 2 MG/ML
4 INJECTION INTRAMUSCULAR; INTRAVENOUS
Status: DISCONTINUED | OUTPATIENT
Start: 2019-09-17 | End: 2019-09-17 | Stop reason: HOSPADM

## 2019-09-17 RX ORDER — LIDOCAINE 40 MG/G
CREAM TOPICAL
Status: DISCONTINUED | OUTPATIENT
Start: 2019-09-17 | End: 2019-09-17 | Stop reason: HOSPADM

## 2019-09-17 RX ORDER — ONDANSETRON 4 MG/1
4 TABLET, ORALLY DISINTEGRATING ORAL EVERY 30 MIN PRN
Status: DISCONTINUED | OUTPATIENT
Start: 2019-09-17 | End: 2019-09-17

## 2019-09-17 RX ORDER — PROPOFOL 10 MG/ML
INJECTION, EMULSION INTRAVENOUS CONTINUOUS PRN
Status: DISCONTINUED | OUTPATIENT
Start: 2019-09-17 | End: 2019-09-17

## 2019-09-17 RX ADMIN — PROPOFOL 20 MG: 10 INJECTION, EMULSION INTRAVENOUS at 08:21

## 2019-09-17 RX ADMIN — LIDOCAINE HYDROCHLORIDE 80 MG: 20 INJECTION, SOLUTION INFILTRATION; PERINEURAL at 08:17

## 2019-09-17 RX ADMIN — PROPOFOL 30 MG: 10 INJECTION, EMULSION INTRAVENOUS at 08:20

## 2019-09-17 RX ADMIN — PROPOFOL 50 MG: 10 INJECTION, EMULSION INTRAVENOUS at 08:17

## 2019-09-17 RX ADMIN — SODIUM CHLORIDE, POTASSIUM CHLORIDE, SODIUM LACTATE AND CALCIUM CHLORIDE: 600; 310; 30; 20 INJECTION, SOLUTION INTRAVENOUS at 07:53

## 2019-09-17 RX ADMIN — LIDOCAINE HYDROCHLORIDE 0.1 ML: 10 INJECTION, SOLUTION EPIDURAL; INFILTRATION; INTRACAUDAL; PERINEURAL at 07:54

## 2019-09-17 RX ADMIN — PROPOFOL 150 MCG/KG/MIN: 10 INJECTION, EMULSION INTRAVENOUS at 08:17

## 2019-09-17 NOTE — ANESTHESIA POSTPROCEDURE EVALUATION
Patient: Marlene Khan    Procedure(s):  COLONOSCOPY, WITH POLYPECTOMY via snare  Tattooing, With Colonoscopy    Diagnosis:Screening  Diagnosis Additional Information: No value filed.    Anesthesia Type:  MAC    Note:  Anesthesia Post Evaluation    Patient location during evaluation: Phase 2 and Bedside  Patient participation: Able to fully participate in evaluation  Level of consciousness: awake and alert  Pain management: adequate  Airway patency: patent  Cardiovascular status: acceptable  Respiratory status: acceptable  Hydration status: acceptable  PONV: none     Anesthetic complications: None          Last vitals:  Vitals:    09/17/19 0735 09/17/19 0841 09/17/19 0857   BP: (!) 164/83 124/82 131/77   Resp: 16 16 16   Temp: 97.8  F (36.6  C)     SpO2: 93% 100% 96%         Electronically Signed By: DANIELLE Aleman CRNA  September 17, 2019  11:29 AM

## 2019-09-17 NOTE — ANESTHESIA CARE TRANSFER NOTE
Patient: Marlene Khan    Procedure(s):  COLONOSCOPY, WITH POLYPECTOMY via snare  Tattooing, With Colonoscopy    Diagnosis: Screening  Diagnosis Additional Information: No value filed.    Anesthesia Type:   MAC     Note:  Airway :Face Mask  Patient transferred to:Phase II  Handoff Report: Identifed the Patient, Identified the Reponsible Provider, Reviewed the pertinent medical history, Discussed the surgical course, Reviewed Intra-OP anesthesia mangement and issues during anesthesia, Set expectations for post-procedure period and Allowed opportunity for questions and acknowledgement of understanding      Vitals: (Last set prior to Anesthesia Care Transfer)    CRNA VITALS  9/17/2019 0812 - 9/17/2019 0912      9/17/2019             Pulse:  65    SpO2:  99 %    Resp Rate (observed):  12                Electronically Signed By: DANIELLE Aleman [facility]  September 17, 2019  11:28 AM

## 2019-09-17 NOTE — H&P
Patient seen for Endoscopy    HPI:  Patient is a 68 year old female with recent stool caliber changes here for screening colonoscopy. Not taking blood thinning medications. No MI or CVA history. No issues with previous sedation. No recent acute illness.    Review Of Systems    Skin: negative  Ears/Nose/Throat: negative  Respiratory: No shortness of breath, dyspnea on exertion, cough, or hemoptysis  Cardiovascular: negative  Gastrointestinal: negative  Genitourinary: negative  Musculoskeletal: negative  Neurologic: negative  Hematologic/Lymphatic/Immunologic: negative  Endocrine: negative      Past Medical History:   Diagnosis Date     Abnormal Papanicolaou smear of cervix and cervical HPV     Abn. Pap smear (cervix), age 24  s/p cryotherapy     Anxiety state, unspecified     anxiety with public speaking     Cough      Excessive or frequent menstruation      Other congenital anomaly of uterus     uterine fibroids     Rosacea 8/16/2011     Unspecified sinusitis (chronic)     Chronic sinusitis       Past Surgical History:   Procedure Laterality Date     BIOPSY BREAST NEEDLE LOCALIZATION  12/2/2010    BIOPSY BREAST NEEDLE LOCALIZATION performed by CHATA DELACRUZ at  OR     BREAST LUMPECTOMY, RT/LT  12/2/2010    Breast Lumpectomy RT/LT followed by [facility]     C TOTAL ABDOM HYSTERECTOMY  11/24/00    Hysterectomy, Total Abdominal, lysis of adhesions     HC COLONOSCOPY W/WO BRUSH/WASH  08/03/00     HC REMOVAL OF OVARY/TUBE(S)  11/24/00    Salpingo-Oophorectomy, bilateral     HC REPAIR OF NASAL SEPTUM      correction of deviated nasal septum       Family History   Problem Relation Age of Onset     Arthritis Mother      Hypertension Mother      Gastrointestinal Disease Mother         GERD     Prostate Cancer Father      Blood Disease Father         unknown what type at this point.     Prostate Cancer Brother      Cancer Maternal Aunt         breast cancer diagnosed in 50s       Social History     Socioeconomic  History     Marital status:      Spouse name: Siddharth     Number of children: 0     Years of education: 18     Highest education level: Not on file   Occupational History     Occupation: Teacher     Employer: [employer]   Social Needs     Financial resource strain: Not on file     Food insecurity:     Worry: Not on file     Inability: Not on file     Transportation needs:     Medical: Not on file     Non-medical: Not on file   Tobacco Use     Smoking status: Never Smoker     Smokeless tobacco: Never Used     Tobacco comment: no smokers in household   Substance and Sexual Activity     Alcohol use: Yes     Comment: Approx 3 drinks q week     Drug use: No     Sexual activity: Yes     Partners: Male     Birth control/protection: Surgical     Comment: Complete hyst   Lifestyle     Physical activity:     Days per week: Not on file     Minutes per session: Not on file     Stress: Not on file   Relationships     Social connections:     Talks on phone: Not on file     Gets together: Not on file     Attends Anabaptist service: Not on file     Active member of club or organization: Not on file     Attends meetings of clubs or organizations: Not on file     Relationship status: Not on file     Intimate partner violence:     Fear of current or ex partner: Not on file     Emotionally abused: Not on file     Physically abused: Not on file     Forced sexual activity: Not on file   Other Topics Concern      Service No     Blood Transfusions No     Caffeine Concern No     Occupational Exposure No     Hobby Hazards No     Sleep Concern Yes     Comment: Pt states that she is not sleeping well     Stress Concern Yes     Comment: Work related     Weight Concern Yes     Comment: Since Hysterectomy pt states that she has had trouble controlling wt.     Special Diet No     Back Care No     Exercise Yes     Comment: walking     Bike Helmet Yes     Seat Belt Yes     Self-Exams Yes     Parent/sibling w/ CABG, MI or  angioplasty before 65F 55M? No   Social History Narrative     Not on file       No current outpatient medications on file.       Medications and history reviewed    Physical exam:  Vitals: BP (!) 164/83   Temp 97.8  F (36.6  C) (Oral)   Resp 16   SpO2 93%   BMI= There is no height or weight on file to calculate BMI.    Constitutional: Healthy, alert, non-distressed   Head: Normo-cephalic, atraumatic, no lesions, masses or tenderness   Cardiovascular: RRR, no new murmurs, +S1, +S2 heart sounds, no clicks, rubs or gallops   Respiratory: CTAB, no rales, rhonchi or wheezing, equal chest rise, good respiratory effort   Gastrointestinal: Soft, non-tender, non distended, no rebound rigidity or guarding, no masses or hernias palpated   : Deferred  Musculoskeletal: Moves all extremities, normal  strength, no deformities noted   Skin: No suspicious lesions or rashes   Psychiatric: Mentation appears normal, affect appropriate   Hematologic/Lymphatic/Immunologic: Normal cervical and supraclavicular lymph nodes   Patient able to get up on table without difficulty.    Labs show:  No results found for this or any previous visit (from the past 24 hour(s)).    Assessment: Endoscopy  Plan: Pt cleared for anesthesia for proposed procedure.    Evelio Chavez, DO

## 2019-09-17 NOTE — ANESTHESIA PREPROCEDURE EVALUATION
Anesthesia Pre-Procedure Evaluation    Patient: Marlene Khan   MRN: [id number] : 1950          Preoperative Diagnosis: Screening    Procedure(s):  COLONOSCOPY    Past Medical History:   Diagnosis Date     Abnormal Papanicolaou smear of cervix and cervical HPV     Abn. Pap smear (cervix), age 24  s/p cryotherapy     Anxiety state, unspecified     anxiety with public speaking     Cough      Excessive or frequent menstruation      Other congenital anomaly of uterus     uterine fibroids     Rosacea 2011     Unspecified sinusitis (chronic)     Chronic sinusitis     Past Surgical History:   Procedure Laterality Date     BIOPSY BREAST NEEDLE LOCALIZATION  2010    BIOPSY BREAST NEEDLE LOCALIZATION performed by CHATA DELACRUZ at  OR     BREAST LUMPECTOMY, RT/LT  2010    Breast Lumpectomy RT/LT followed by [facility] TOTAL ABDOM HYSTERECTOMY  00    Hysterectomy, Total Abdominal, lysis of adhesions     HC COLONOSCOPY W/WO BRUSH/WASH  00     HC REMOVAL OF OVARY/TUBE(S)  00    Salpingo-Oophorectomy, bilateral     HC REPAIR OF NASAL SEPTUM      correction of deviated nasal septum       Anesthesia Evaluation     . Pt has had prior anesthetic. Type: MAC and General    No history of anesthetic complications          ROS/MED HX    ENT/Pulmonary:     (+)Intermittent asthma Treatment: Inhaler prn,  , . .    Neurologic:  - neg neurologic ROS     Cardiovascular:  - neg cardiovascular ROS       METS/Exercise Tolerance:     Hematologic:  - neg hematologic  ROS       Musculoskeletal:  - neg musculoskeletal ROS       GI/Hepatic:  - neg GI/hepatic ROS       Renal/Genitourinary:  - ROS Renal section negative       Endo:  - neg endo ROS       Psychiatric:  - neg psychiatric ROS       Infectious Disease:  - neg infectious disease ROS       Malignancy:      - no malignancy   Other:    - neg other ROS                      Physical Exam  Normal systems: cardiovascular, pulmonary and  "dental    Airway   Mallampati: II  TM distance: <3 FB  Neck ROM: full    Dental     Cardiovascular   Rhythm and rate: regular and normal      Pulmonary    breath sounds clear to auscultation            Lab Results   Component Value Date    WBC 6.7 12/02/2016    HGB 13.9 12/02/2016    HCT 42.7 12/02/2016     12/02/2016    CRP 5.5 12/15/2006    SED 9 12/15/2006     11/20/2012    POTASSIUM 4.7 11/20/2012    CHLORIDE 109 11/20/2012    CO2 27 11/20/2012    BUN 20 11/20/2012    CR 0.86 11/20/2012     (H) 11/20/2012    ANN 9.3 11/20/2012    ALBUMIN 4.3 11/20/2012    PROTTOTAL 7.6 11/20/2012    ALT 28 11/20/2012    AST 21 11/20/2012    ALKPHOS 63 11/20/2012    BILITOTAL 0.6 11/20/2012    TSH 2.25 10/04/2010       Preop Vitals  BP Readings from Last 3 Encounters:   06/21/19 134/72   03/05/19 148/88   12/04/18 152/90    Pulse Readings from Last 3 Encounters:   06/21/19 86   03/05/19 85   12/04/18 79      Resp Readings from Last 3 Encounters:   06/21/19 16   03/05/19 16   12/04/18 14    SpO2 Readings from Last 3 Encounters:   03/05/19 96%   12/04/18 96%   05/21/18 98%      Temp Readings from Last 1 Encounters:   06/21/19 97  F (36.1  C) (Temporal)    Ht Readings from Last 1 Encounters:   06/21/19 1.74 m (5' 8.5\")      Wt Readings from Last 1 Encounters:   06/21/19 95.7 kg (211 lb)    Estimated body mass index is 31.62 kg/m  as calculated from the following:    Height as of 6/21/19: 1.74 m (5' 8.5\").    Weight as of 6/21/19: 95.7 kg (211 lb).       Anesthesia Plan      History & Physical Review  History and physical reviewed and following examination; no interval change.    ASA Status:  2 .    NPO Status:  > 8 hours    Plan for MAC with Propofol induction. Maintenance will be TIVA.  Reason for MAC:  Deep or markedly invasive procedure (G8)  PONV prophylaxis:  Ondansetron (or other 5HT-3)       Postoperative Care  Postoperative pain management:  IV analgesics.      Consents  Anesthetic plan, risks, benefits " and alternatives discussed with:  Patient.  Use of blood products discussed: No .   .                 DANIELLE Aleman CRNA

## 2019-09-19 LAB — COPATH REPORT: NORMAL

## 2019-09-23 DIAGNOSIS — C18.4 MALIGNANT NEOPLASM OF TRANSVERSE COLON (H): Primary | ICD-10-CM

## 2019-09-24 ENCOUNTER — HOSPITAL ENCOUNTER (OUTPATIENT)
Dept: CT IMAGING | Facility: CLINIC | Age: 69
Discharge: HOME | End: 2019-09-24
Attending: SURGERY | Admitting: SURGERY
Payer: MEDICARE

## 2019-09-24 DIAGNOSIS — C18.4 MALIGNANT NEOPLASM OF TRANSVERSE COLON (H): ICD-10-CM

## 2019-09-24 LAB
ALBUMIN SERPL-MCNC: 4 G/DL (ref 3.4–5)
ALP SERPL-CCNC: 78 U/L (ref 40–150)
ALT SERPL W P-5'-P-CCNC: 34 U/L (ref 0–50)
ANION GAP SERPL CALCULATED.3IONS-SCNC: 5 MMOL/L (ref 3–14)
AST SERPL W P-5'-P-CCNC: 17 U/L (ref 0–45)
BASOPHILS # BLD AUTO: 0 10E9/L (ref 0–0.2)
BASOPHILS NFR BLD AUTO: 0.5 %
BILIRUB DIRECT SERPL-MCNC: 0.2 MG/DL (ref 0–0.2)
BILIRUB SERPL-MCNC: 0.8 MG/DL (ref 0.2–1.3)
BUN SERPL-MCNC: 14 MG/DL (ref 7–30)
CALCIUM SERPL-MCNC: 9.4 MG/DL (ref 8.5–10.1)
CEA SERPL-MCNC: 2.6 UG/L (ref 0–2.5)
CHLORIDE SERPL-SCNC: 111 MMOL/L (ref 94–109)
CO2 SERPL-SCNC: 28 MMOL/L (ref 20–32)
CREAT SERPL-MCNC: 0.73 MG/DL (ref 0.52–1.04)
DIFFERENTIAL METHOD BLD: NORMAL
EOSINOPHIL NFR BLD AUTO: 2 %
ERYTHROCYTE [DISTWIDTH] IN BLOOD BY AUTOMATED COUNT: 13 % (ref 10–15)
GFR SERPL CREATININE-BSD FRML MDRD: 85 ML/MIN/{1.73_M2}
GLUCOSE SERPL-MCNC: 107 MG/DL (ref 70–99)
HCT VFR BLD AUTO: 44.6 % (ref 35–47)
HGB BLD-MCNC: 15 G/DL (ref 11.7–15.7)
IMM GRANULOCYTES # BLD: 0 10E9/L (ref 0–0.4)
IMM GRANULOCYTES NFR BLD: 0.2 %
LYMPHOCYTES # BLD AUTO: 2 10E9/L (ref 0.8–5.3)
LYMPHOCYTES NFR BLD AUTO: 35.6 %
MCH RBC QN AUTO: 32.9 PG (ref 26.5–33)
MCHC RBC AUTO-ENTMCNC: 33.6 G/DL (ref 31.5–36.5)
MCV RBC AUTO: 98 FL (ref 78–100)
MONOCYTES # BLD AUTO: 0.4 10E9/L (ref 0–1.3)
MONOCYTES NFR BLD AUTO: 7.6 %
NEUTROPHILS # BLD AUTO: 3 10E9/L (ref 1.6–8.3)
NEUTROPHILS NFR BLD AUTO: 54.1 %
NRBC # BLD AUTO: 0 10*3/UL
NRBC BLD AUTO-RTO: 0 /100
PLATELET # BLD AUTO: 268 10E9/L (ref 150–450)
POTASSIUM SERPL-SCNC: 4.3 MMOL/L (ref 3.4–5.3)
PROT SERPL-MCNC: 7.5 G/DL (ref 6.8–8.8)
RBC # BLD AUTO: 4.56 10E12/L (ref 3.8–5.2)
SODIUM SERPL-SCNC: 144 MMOL/L (ref 133–144)
WBC # BLD AUTO: 5.5 10E9/L (ref 4–11)

## 2019-09-24 PROCEDURE — 25000125 ZZHC RX 250: Performed by: RADIOLOGY

## 2019-09-24 PROCEDURE — 85025 COMPLETE CBC W/AUTO DIFF WBC: CPT | Performed by: SURGERY

## 2019-09-24 PROCEDURE — 36415 COLL VENOUS BLD VENIPUNCTURE: CPT | Performed by: SURGERY

## 2019-09-24 PROCEDURE — 25000128 H RX IP 250 OP 636: Performed by: RADIOLOGY

## 2019-09-24 PROCEDURE — 74177 CT ABD & PELVIS W/CONTRAST: CPT

## 2019-09-24 PROCEDURE — 82378 CARCINOEMBRYONIC ANTIGEN: CPT | Performed by: SURGERY

## 2019-09-24 PROCEDURE — 80048 BASIC METABOLIC PNL TOTAL CA: CPT | Performed by: SURGERY

## 2019-09-24 PROCEDURE — 71260 CT THORAX DX C+: CPT

## 2019-09-24 PROCEDURE — 80076 HEPATIC FUNCTION PANEL: CPT | Performed by: SURGERY

## 2019-09-24 RX ORDER — IOPAMIDOL 755 MG/ML
500 INJECTION, SOLUTION INTRAVASCULAR ONCE
Status: COMPLETED | OUTPATIENT
Start: 2019-09-24 | End: 2019-09-24

## 2019-09-24 RX ADMIN — IOPAMIDOL 100 ML: 755 INJECTION, SOLUTION INTRAVENOUS at 09:07

## 2019-09-24 RX ADMIN — SODIUM CHLORIDE 60 ML: 9 INJECTION, SOLUTION INTRAVENOUS at 09:08

## 2019-09-25 ENCOUNTER — OFFICE VISIT (OUTPATIENT)
Dept: SURGERY | Facility: CLINIC | Age: 69
End: 2019-09-25
Payer: MEDICARE

## 2019-09-25 VITALS
BODY MASS INDEX: 30.96 KG/M2 | SYSTOLIC BLOOD PRESSURE: 120 MMHG | DIASTOLIC BLOOD PRESSURE: 80 MMHG | HEIGHT: 69 IN | WEIGHT: 209 LBS | TEMPERATURE: 97.4 F

## 2019-09-25 DIAGNOSIS — C18.4 MALIGNANT NEOPLASM OF TRANSVERSE COLON (H): Primary | ICD-10-CM

## 2019-09-25 PROCEDURE — 99214 OFFICE O/P EST MOD 30 MIN: CPT | Performed by: SURGERY

## 2019-09-25 ASSESSMENT — MIFFLIN-ST. JEOR: SCORE: 1539.46

## 2019-09-25 NOTE — PROGRESS NOTES
Patient seen in consultation for Colon cancer    HPI:  Patient is a 68 year old female with newly diagnosed colon cancer of the transverse colon presents today to discuss next steps in treatment. She underwent screening colonoscopy last week and was found to have a small concerning lesion that was mostly removed via snare but unfortunately came back as an adenocarcinoma. This was at approximately 75-80cm from the anus. The area was tattooed. I recommended labs and imaging for staging purposes which she had done yesterday. There is no obvious metastatic disease on imaging and the only significant lab abnormality was CEA of 2.6. She denies any significant family history of colon cancer. She has a personal history of LCIS and lumpectomy done at [facility] in 2010. She did endorse change of stool caliber prior to the colonoscopy. She had one negative colonoscopy prior to this one.    Review Of Systems    Skin: negative  Ears/Nose/Throat: negative  Respiratory: No shortness of breath, dyspnea on exertion, cough, or hemoptysis  Cardiovascular: negative  Gastrointestinal: as above  Genitourinary: negative  Musculoskeletal: negative  Neurologic: negative  Hematologic/Lymphatic/Immunologic: negative  Endocrine: negative      Past Medical History:   Diagnosis Date     Abnormal Papanicolaou smear of cervix and cervical HPV     Abn. Pap smear (cervix), age 24  s/p cryotherapy     Anxiety state, unspecified     anxiety with public speaking     Cough      Excessive or frequent menstruation      Other congenital anomaly of uterus     uterine fibroids     Rosacea 8/16/2011     Unspecified sinusitis (chronic)     Chronic sinusitis       Past Surgical History:   Procedure Laterality Date     BIOPSY BREAST NEEDLE LOCALIZATION  12/2/2010    BIOPSY BREAST NEEDLE LOCALIZATION performed by CHATA DELACRUZ at  OR     BREAST LUMPECTOMY, RT/LT  12/2/2010    Breast Lumpectomy RT/LT [facility] by [facility]     C TOTAL ABDOM  SQBS [id number]. Dr. Marisol Michelle paged. Awaiting call back. HYSTERECTOMY  11/24/00    Hysterectomy, Total Abdominal, lysis of adhesions     COLONOSCOPY N/A 9/17/2019    Procedure: COLONOSCOPY, WITH POLYPECTOMY via snare;  Surgeon: Evelio Chavez DO;  Location: [facility]     HC COLONOSCOPY W/WO BRUSH/WASH  08/03/00     HC REMOVAL OF OVARY/TUBE(S)  11/24/00    Salpingo-Oophorectomy, bilateral     HC REPAIR OF NASAL SEPTUM      correction of deviated nasal septum       Family History   Problem Relation Age of Onset     Arthritis Mother      Hypertension Mother      Gastrointestinal Disease Mother         GERD     Prostate Cancer Father      Blood Disease Father         unknown what type at this point.     Prostate Cancer Brother      Cancer Maternal Aunt         breast cancer diagnosed in 50s       Social History     Socioeconomic History     Marital status:      Spouse name: Siddharth     Number of children: 0     Years of education: 18     Highest education level: Not on file   Occupational History     Occupation: Teacher     Employer: [employer]   Social Needs     Financial resource strain: Not on file     Food insecurity:     Worry: Not on file     Inability: Not on file     Transportation needs:     Medical: Not on file     Non-medical: Not on file   Tobacco Use     Smoking status: Never Smoker     Smokeless tobacco: Never Used     Tobacco comment: no smokers in household   Substance and Sexual Activity     Alcohol use: Yes     Comment: Approx 3 drinks q week     Drug use: No     Sexual activity: Yes     Partners: Male     Birth control/protection: Surgical     Comment: Complete hyst   Lifestyle     Physical activity:     Days per week: Not on file     Minutes per session: Not on file     Stress: Not on file   Relationships     Social connections:     Talks on phone: Not on file     Gets together: Not on file     Attends [employer] service: Not on file     Active member of club or organization: Not on file     Attends meetings of clubs or organizations:  "Not on file     Relationship status: Not on file     Intimate partner violence:     Fear of current or ex partner: Not on file     Emotionally abused: Not on file     Physically abused: Not on file     Forced sexual activity: Not on file   Other Topics Concern      Service No     Blood Transfusions No     Caffeine Concern No     Occupational Exposure No     Hobby Hazards No     Sleep Concern Yes     Comment: Pt states that she is not sleeping well     Stress Concern Yes     Comment: Work related     Weight Concern Yes     Comment: Since Hysterectomy pt states that she has had trouble controlling wt.     Special Diet No     Back Care No     Exercise Yes     Comment: walking     Bike Helmet Yes     Seat Belt Yes     Self-Exams Yes     Parent/sibling w/ CABG, MI or angioplasty before 65F 55M? No   Social History Narrative     Not on file       Current Outpatient Medications   Medication Sig Dispense Refill     azelaic acid (FINACIA) 15 % external gel Apply topically 2 times daily 50 g 0     CALCIUM-VITAMIN D PO Take 2 tablets by mouth 2 times daily.       cetirizine (ZYRTEC) 10 MG tablet Take 10 mg by mouth daily as needed       Cranberry 300 MG TABS Take 1 tablet by mouth 2 times daily.       doxycycline monohydrate (MONODOX) 100 MG capsule Take 1 capsule (100 mg) by mouth 2 times daily 60 capsule 2     metroNIDAZOLE (METROCREAM) 0.75 % external cream Apply thin layer to face once to twice daily. 45 g 11     metroNIDAZOLE (METROGEL) 0.75 % external gel Apply topically 2 times daily 45 g 11     multivitamin (CENTRUM SILVER) tablet Take 1 tablet by mouth daily       multivitamin (OCUVITE) TABS Take 1 tablet by mouth daily       permethrin (ELIMITE) 5 % external cream Apply thin layer to face once to twice daily. 60 g 11       Medications and history reviewed    Physical exam:  Vitals: /80   Temp 97.4  F (36.3  C) (Temporal)   Ht 1.74 m (5' 8.5\")   Wt 94.8 kg (209 lb)   BMI 31.32 kg/m    BMI= Body mass " index is 31.32 kg/m .    Constitutional: Healthy, alert, non-distressed   Head: Normo-cephalic, atraumatic, no lesions, masses or tenderness   Cardiovascular: RRR, no new murmurs, +S1, +S2 heart sounds, no clicks, rubs or gallops   Respiratory: CTAB, no rales, rhonchi or wheezing, equal chest rise, good respiratory effort   Gastrointestinal: Soft, non-tender, non distended, no rebound rigidity or guarding, no masses or hernias palpated   : Deferred  Musculoskeletal: Moves all extremities, normal  strength, no deformities noted   Skin: No suspicious lesions or rashes   Psychiatric: Mentation appears normal, affect appropriate   Hematologic/Lymphatic/Immunologic: Normal cervical and supraclavicular lymph nodes   Patient able to get up on table without difficulty.    Labs show:  No results found for this or any previous visit (from the past 24 hour(s)).    Imaging shows:  Recent Results (from the past 744 hour(s))   CT Chest/Abdomen/Pelvis w Contrast    Narrative    CT CHEST, ABDOMEN AND PELVIS WITH CONTRAST  9/24/2019 9:25 AM    HISTORY: Colon cancer, non-metastatic, initial workup. Malignant  neoplasm of transverse colon (H).    TECHNIQUE: Scans obtained of the chest, abdomen, and pelvis with IV  contrast. 100 mL isovue-370 injected. Radiation dose for this scan was  reduced using automated exposure  control, adjustment of the mA and/or kV according to patient size, or  iterative reconstruction technique.    COMPARISON: Chest x-rays dated 9/29/2007.    FINDINGS:   Chest: Calcified granuloma in the left lower midlung measures up to  1.2 cm in diameter (image 232 series 6). The lungs are otherwise  grossly clear without infiltrate, effusion, mass, or noncalcified  nodule.    The heart is upper normal size. No mediastinum, hilar or axillary  lymphadenopathy. No obvious central pulmonary artery filling defects  to suggest central pulmonary artery embolism. The thoracic aorta is of  normal caliber and demonstrates  no evidence for dissection or  significant atherosclerosis.    Visualized portions of the thyroid are unremarkable. No acute osseous  fractures are identified. There are degenerative changes in the spine.  Subtle areas of increased density in the T12 vertebral body on the  left side are of uncertain clinical significance. Subtle blastic  metastases are considered unlikely but are not entirely excluded. No  other evidence for aggressive osseous lesions is seen.    Abdomen and pelvis: There is diffuse fatty infiltration of the liver.  A few tiny calcified granulomata are noted in the spleen. The liver,  gallbladder, pancreas, spleen, bilateral adrenal glands and bilateral  kidneys otherwise enhance normally. No hydronephrosis,  nephrolithiasis, hydroureter or ureteral calculus is evident. The  urinary bladder is grossly unremarkable.    No adenopathy, free fluid or free air is seen in the peritoneal  cavity. Aorta is normal in appearance.    There are scattered diverticuli in the colon, most predominantly seen  in the descending and sigmoid colon. No pericolonic inflammatory  change to suggest acute diverticulitis. There are scattered  diverticuli as well in the transverse colon. Appendix is normal in  appearance since it extends posteriorly and inferiorly from the cecum  and terminates at the cephalad aspect of the urinary bladder. Small  bowel is of normal caliber. Stomach contains air and fluid but is  otherwise unremarkable. Site of patient's reported colon cancer is not  definitely seen on this study.      Impression    IMPRESSION:  1. No definite evidence for primary malignancy or metastasis is  identified on this study.  2. Colonic diverticulosis without evidence for acute diverticulitis.  3. Diffuse fatty infiltration of the liver.  4. Several blastic areas in the T12 vertebra are of uncertain clinical  significance. Subtle blastic metastases are considered unlikely but  are not entirely excluded. No other  evidence for osseous metastasis.   5. Evidence of chronic granulomatous disease of the left lung and of  the spleen.    ELEUTERIO SAHNI MD       Assessment:     ICD-10-CM    1. Malignant neoplasm of transverse colon (H) C18.4 COLORECTAL SURGERY REFERRAL     Plan: We had a very long discussion about the treatment for colon cancer. I explained how final staging would be done after surgical resection of portion of the colon, but at this time based on the labs and imaging there is no evidence to suggest metastatic disease. The recommended resection would be either extended right mundo-colectomy if this ends up being in the mid transverse vs left mundo-colectomy if this ends up being more in the splenic flexure. The lesion was 75-80cm from the anal opening and was tattooed but was not clipped and not visible on the CT imaging so verification of exact location would be done on the day of surgery with a laparoscope to find the tattoo markings vs intra-operative colonoscopy to find tattoo and resection site. Alternatively you could do another colonoscopy in the time prior to surgery. We talked about how endoscopic surveillance of the lesion would be unacceptable given the characteristics of the lesion as seen endoscopically and on the pathology. Consideration for chemotherapy would be discussed following surgery depending on final pathology and stage. I described the procedure of laparoscopic assisted partial colectomy and the expected length of stay as well as post-operative care and restrictions. I also offered to have the patient see a colo-rectal surgeon if this was their preference. Given her personal history of LCIS taken care of and surveillance done at [facility] at this time they are wishing to see a colo-rectal surgeon there for another opinion. This is completely acceptable and we can help make that happen. I told them that I would be here for any other questions or if they ultimately choose to have surgery here  that is fine too. A referral to [place] has been placed for colo-rectal surgery.     Evelio Chavez,

## 2019-09-25 NOTE — LETTER
9/25/2019         RE: Marlene Khan  [address] [facility] [address]        Dear Colleague,    Thank you for referring your patient, Marlene Khan, to the [facility]. Please see a copy of my visit note below.    Patient seen in consultation for Colon cancer    HPI:  Patient is a 68 year old female with newly diagnosed colon cancer of the transverse colon presents today to discuss next steps in treatment. She underwent screening colonoscopy last week and was found to have a small concerning lesion that was mostly removed via snare but unfortunately came back as an adenocarcinoma. This was at approximately 75-80cm from the anus. The area was tattooed. I recommended labs and imaging for staging purposes which she had done yesterday. There is no obvious metastatic disease on imaging and the only significant lab abnormality was CEA of 2.6. She denies any significant family history of colon cancer. She has a personal history of LCIS and lumpectomy done at [facility] in 2010. She did endorse change of stool caliber prior to the colonoscopy. She had one negative colonoscopy prior to this one.    Review Of Systems    Skin: negative  Ears/Nose/Throat: negative  Respiratory: No shortness of breath, dyspnea on exertion, cough, or hemoptysis  Cardiovascular: negative  Gastrointestinal: as above  Genitourinary: negative  Musculoskeletal: negative  Neurologic: negative  Hematologic/Lymphatic/Immunologic: negative  Endocrine: negative      Past Medical History:   Diagnosis Date     Abnormal Papanicolaou smear of cervix and cervical HPV     Abn. Pap smear (cervix), age 24  s/p cryotherapy     Anxiety state, unspecified     anxiety with public speaking     Cough      Excessive or frequent menstruation      Other congenital anomaly of uterus     uterine fibroids     Rosacea 8/16/2011     Unspecified sinusitis (chronic)     Chronic sinusitis       Past Surgical History:   Procedure Laterality Date      BIOPSY BREAST NEEDLE LOCALIZATION  12/2/2010    BIOPSY BREAST NEEDLE LOCALIZATION performed by CHATA DELACRUZ at  OR     BREAST LUMPECTOMY, RT/LT  12/2/2010    Breast Lumpectomy RT/LT followed by [facility]     C TOTAL ABDOM HYSTERECTOMY  11/24/00    Hysterectomy, Total Abdominal, lysis of adhesions     COLONOSCOPY N/A 9/17/2019    Procedure: COLONOSCOPY, WITH POLYPECTOMY via snare;  Surgeon: Evelio Chavez DO;  Location:  GI     HC COLONOSCOPY W/WO BRUSH/WASH  08/03/00     HC REMOVAL OF OVARY/TUBE(S)  11/24/00    Salpingo-Oophorectomy, bilateral     HC REPAIR OF NASAL SEPTUM      correction of deviated nasal septum       Family History   Problem Relation Age of Onset     Arthritis Mother      Hypertension Mother      Gastrointestinal Disease Mother         GERD     Prostate Cancer Father      Blood Disease Father         unknown what type at this point.     Prostate Cancer Brother      Cancer Maternal Aunt         breast cancer diagnosed in 50s       Social History     Socioeconomic History     Marital status:      Spouse name: Siddharth     Number of children: 0     Years of education: 18     Highest education level: Not on file   Occupational History     Occupation: Teacher     Employer: [employer]   Social Needs     Financial resource strain: Not on file     Food insecurity:     Worry: Not on file     Inability: Not on file     Transportation needs:     Medical: Not on file     Non-medical: Not on file   Tobacco Use     Smoking status: Never Smoker     Smokeless tobacco: Never Used     Tobacco comment: no smokers in household   Substance and Sexual Activity     Alcohol use: Yes     Comment: Approx 3 drinks q week     Drug use: No     Sexual activity: Yes     Partners: Male     Birth control/protection: Surgical     Comment: Complete hyst   Lifestyle     Physical activity:     Days per week: Not on file     Minutes per session: Not on file     Stress: Not on file    Relationships     Social connections:     Talks on phone: Not on file     Gets together: Not on file     Attends Taoist service: Not on file     Active member of club or organization: Not on file     Attends meetings of clubs or organizations: Not on file     Relationship status: Not on file     Intimate partner violence:     Fear of current or ex partner: Not on file     Emotionally abused: Not on file     Physically abused: Not on file     Forced sexual activity: Not on file   Other Topics Concern      Service No     Blood Transfusions No     Caffeine Concern No     Occupational Exposure No     Hobby Hazards No     Sleep Concern Yes     Comment: Pt states that she is not sleeping well     Stress Concern Yes     Comment: Work related     Weight Concern Yes     Comment: Since Hysterectomy pt states that she has had trouble controlling wt.     Special Diet No     Back Care No     Exercise Yes     Comment: walking     Bike Helmet Yes     Seat Belt Yes     Self-Exams Yes     Parent/sibling w/ CABG, MI or angioplasty before 65F 55M? No   Social History Narrative     Not on file       Current Outpatient Medications   Medication Sig Dispense Refill     azelaic acid (FINACIA) 15 % external gel Apply topically 2 times daily 50 g 0     CALCIUM-VITAMIN D PO Take 2 tablets by mouth 2 times daily.       cetirizine (ZYRTEC) 10 MG tablet Take 10 mg by mouth daily as needed       Cranberry 300 MG TABS Take 1 tablet by mouth 2 times daily.       doxycycline monohydrate (MONODOX) 100 MG capsule Take 1 capsule (100 mg) by mouth 2 times daily 60 capsule 2     metroNIDAZOLE (METROCREAM) 0.75 % external cream Apply thin layer to face once to twice daily. 45 g 11     metroNIDAZOLE (METROGEL) 0.75 % external gel Apply topically 2 times daily 45 g 11     multivitamin (CENTRUM SILVER) tablet Take 1 tablet by mouth daily       multivitamin (OCUVITE) TABS Take 1 tablet by mouth daily       permethrin (ELIMITE) 5 % external cream  "Apply thin layer to face once to twice daily. 60 g 11       Medications and history reviewed    Physical exam:  Vitals: /80   Temp 97.4  F (36.3  C) (Temporal)   Ht 1.74 m (5' 8.5\")   Wt 94.8 kg (209 lb)   BMI 31.32 kg/m     BMI= Body mass index is 31.32 kg/m .    Constitutional: Healthy, alert, non-distressed   Head: Normo-cephalic, atraumatic, no lesions, masses or tenderness   Cardiovascular: RRR, no new murmurs, +S1, +S2 heart sounds, no clicks, rubs or gallops   Respiratory: CTAB, no rales, rhonchi or wheezing, equal chest rise, good respiratory effort   Gastrointestinal: Soft, non-tender, non distended, no rebound rigidity or guarding, no masses or hernias palpated   : Deferred  Musculoskeletal: Moves all extremities, normal  strength, no deformities noted   Skin: No suspicious lesions or rashes   Psychiatric: Mentation appears normal, affect appropriate   Hematologic/Lymphatic/Immunologic: Normal cervical and supraclavicular lymph nodes   Patient able to get up on table without difficulty.    Labs show:  No results found for this or any previous visit (from the past 24 hour(s)).    Imaging shows:  Recent Results (from the past 744 hour(s))   CT Chest/Abdomen/Pelvis w Contrast    Narrative    CT CHEST, ABDOMEN AND PELVIS WITH CONTRAST  9/24/2019 9:25 AM    HISTORY: Colon cancer, non-metastatic, initial workup. Malignant  neoplasm of transverse colon (H).    TECHNIQUE: Scans obtained of the chest, abdomen, and pelvis with IV  contrast. 100 mL isovue-370 injected. Radiation dose for this scan was  reduced using automated exposure  control, adjustment of the mA and/or kV according to patient size, or  iterative reconstruction technique.    COMPARISON: Chest x-rays dated 9/29/2007.    FINDINGS:   Chest: Calcified granuloma in the left lower midlung measures up to  1.2 cm in diameter (image 232 series 6). The lungs are otherwise  grossly clear without infiltrate, effusion, mass, or " noncalcified  nodule.    The heart is upper normal size. No mediastinum, hilar or axillary  lymphadenopathy. No obvious central pulmonary artery filling defects  to suggest central pulmonary artery embolism. The thoracic aorta is of  normal caliber and demonstrates no evidence for dissection or  significant atherosclerosis.    Visualized portions of the thyroid are unremarkable. No acute osseous  fractures are identified. There are degenerative changes in the spine.  Subtle areas of increased density in the T12 vertebral body on the  left side are of uncertain clinical significance. Subtle blastic  metastases are considered unlikely but are not entirely excluded. No  other evidence for aggressive osseous lesions is seen.    Abdomen and pelvis: There is diffuse fatty infiltration of the liver.  A few tiny calcified granulomata are noted in the spleen. The liver,  gallbladder, pancreas, spleen, bilateral adrenal glands and bilateral  kidneys otherwise enhance normally. No hydronephrosis,  nephrolithiasis, hydroureter or ureteral calculus is evident. The  urinary bladder is grossly unremarkable.    No adenopathy, free fluid or free air is seen in the peritoneal  cavity. Aorta is normal in appearance.    There are scattered diverticuli in the colon, most predominantly seen  in the descending and sigmoid colon. No pericolonic inflammatory  change to suggest acute diverticulitis. There are scattered  diverticuli as well in the transverse colon. Appendix is normal in  appearance since it extends posteriorly and inferiorly from the cecum  and terminates at the cephalad aspect of the urinary bladder. Small  bowel is of normal caliber. Stomach contains air and fluid but is  otherwise unremarkable. Site of patient's reported colon cancer is not  definitely seen on this study.      Impression    IMPRESSION:  1. No definite evidence for primary malignancy or metastasis is  identified on this study.  2. Colonic diverticulosis  without evidence for acute diverticulitis.  3. Diffuse fatty infiltration of the liver.  4. Several blastic areas in the T12 vertebra are of uncertain clinical  significance. Subtle blastic metastases are considered unlikely but  are not entirely excluded. No other evidence for osseous metastasis.   5. Evidence of chronic granulomatous disease of the left lung and of  the spleen.    ELEUTERIO SAHNI MD       Assessment:     ICD-10-CM    1. Malignant neoplasm of transverse colon (H) C18.4 COLORECTAL SURGERY REFERRAL     Plan: We had a very long discussion about the treatment for colon cancer. I explained how final staging would be done after surgical resection of portion of the colon, but at this time based on the labs and imaging there is no evidence to suggest metastatic disease. The recommended resection would be either extended right mundo-colectomy if this ends up being in the mid transverse vs left mundo-colectomy if this ends up being more in the splenic flexure. The lesion was 75-80cm from the anal opening and was tattooed but was not clipped and not visible on the CT imaging so verification of exact location would be done on the day of surgery with a laparoscope to find the tattoo markings vs intra-operative colonoscopy to find tattoo and resection site. Alternatively you could do another colonoscopy in the time prior to surgery. We talked about how endoscopic surveillance of the lesion would be unacceptable given the characteristics of the lesion as seen endoscopically and on the pathology. Consideration for chemotherapy would be discussed following surgery depending on final pathology and stage. I described the procedure of laparoscopic assisted partial colectomy and the expected length of stay as well as post-operative care and restrictions. I also offered to have the patient see a colo-rectal surgeon if this was their preference. Given her personal history of LCIS taken care of and surveillance done at [facility] at this time they are wishing to see a colo-rectal surgeon there for another opinion. This is completely acceptable and we can help make that happen. I told them that I would be here for any other questions or if they ultimately choose to have surgery here that is fine too. A referral to [place] has been placed for colo-rectal surgery.     Evelio Chavez, DO      Again, thank you for allowing me to participate in the care of your patient.        Sincerely,        Evelio Chavez, DO

## 2019-09-28 ENCOUNTER — HEALTH MAINTENANCE LETTER (OUTPATIENT)
Age: 69
End: 2019-09-28

## 2019-10-25 ENCOUNTER — TELEPHONE (OUTPATIENT)
Dept: SURGERY | Facility: CLINIC | Age: 69
End: 2019-10-25

## 2019-10-25 NOTE — TELEPHONE ENCOUNTER
Reason for Call:  Other call back    Detailed comments: [facility] has been asking for pathology report on this patient from colonoscopy done 9/17/19  Please call Inga at [phone] as soon as possible.  If she is on the phone please ask to interrupt.  Thanks    Phone Number Patient can be reached at: Other phone number:  [phone]*    Best Time: any    Can we leave a detailed message on this number? na    Call taken on 10/25/2019 at 9:26 AM by Veena Corcoran

## 2019-10-25 NOTE — TELEPHONE ENCOUNTER
Spoke to Inga at [place] and   She does not need the FABRIZIO- she got everything already for this patient.

## 2019-10-25 NOTE — TELEPHONE ENCOUNTER
Patient will go to [facility] to sign FABRIZIO for [place] to get sildes released to them.  Taran WEBSTER CMA

## 2019-12-11 ENCOUNTER — OFFICE VISIT (OUTPATIENT)
Dept: DERMATOLOGY | Facility: CLINIC | Age: 69
End: 2019-12-11
Payer: MEDICARE

## 2019-12-11 DIAGNOSIS — L71.9 ACNE ROSACEA: Primary | ICD-10-CM

## 2019-12-11 PROCEDURE — 99213 OFFICE O/P EST LOW 20 MIN: CPT | Performed by: DERMATOLOGY

## 2019-12-11 RX ORDER — DOXYCYCLINE 40 MG/1
CAPSULE ORAL
Qty: 30 CAPSULE | Refills: 2 | Status: SHIPPED | OUTPATIENT
Start: 2019-12-11 | End: 2022-11-03

## 2019-12-11 ASSESSMENT — ACTIVITIES OF DAILY LIVING (ADL)
DRESS: 0-->INDEPENDENT
AMBULATION: 0-->INDEPENDENT
TOILETING: 0-->INDEPENDENT
RETIRED_EATING: 0-->INDEPENDENT
COGNITION: 0 - NO COGNITION ISSUES REPORTED
TRANSFERRING: 0-->INDEPENDENT
FALL_HISTORY_WITHIN_LAST_SIX_MONTHS: NO
BATHING: 0-->INDEPENDENT
SWALLOWING: 0-->SWALLOWS FOODS/LIQUIDS WITHOUT DIFFICULTY
RETIRED_COMMUNICATION: 0-->UNDERSTANDS/COMMUNICATES WITHOUT DIFFICULTY

## 2019-12-11 ASSESSMENT — PAIN SCALES - GENERAL: PAINLEVEL: NO PAIN (0)

## 2019-12-11 NOTE — LETTER
"    12/11/2019         RE: Marlene Khan  [address] [facility] [address]        Dear Colleague,    Thank you for referring your patient, Marlene Khan, to the [facility]. Please see a copy of my visit note below.    [facility] Dermatology Note    Dermatology Problem List:  1. Rosacea, papular  -current t/x: Metrogel 0.75% gel, Azelaic Acid 15%, Permethrin cream.  -s/p doxycycline 50 mg daily for years - flared when attempted to stop. Did doxycycline 100 mg BID x3 months starting 9/2019.    Encounter Date: Dec 11, 2019    CC:  Chief Complaint   Patient presents with     RECHECK     Butch is returning with notable improvement     History of Present Illness:  Ms. Rosario \"BUTCH\" NICK Khan is returning for rosacea. She was last seen in Sept when plan was to start topicals and bridge with 3 months of oral doxycycline 100mg BID. Patient notes she did topicals as instructed, but did not do oral doxy as prescribed. She mostly took once daily. She took some time off entirely due to diagnosis of colon cancer and need for procedures. Despite this, her skin is doing great now. It has been a month since she had any blemishes. She wonders about what to do going further.    No other concerns addressed today.    Past Medical History:   Patient Active Problem List   Diagnosis     Persistent disorder of initiating or maintaining sleep     Phobia     Lobular carcinoma in situ     Intermittent asthma     Advanced directives, counseling/discussion     Rosacea     Past Medical History:   Diagnosis Date     Abnormal Papanicolaou smear of cervix and cervical HPV     Abn. Pap smear (cervix), age 24  s/p cryotherapy     Anxiety state, unspecified     anxiety with public speaking     Cough      Excessive or frequent menstruation      Other congenital anomaly of uterus     uterine fibroids     Rosacea 8/16/2011     Unspecified sinusitis (chronic)     Chronic sinusitis     Past Surgical " "History:   Procedure Laterality Date     BIOPSY BREAST NEEDLE LOCALIZATION  12/2/2010    BIOPSY BREAST NEEDLE LOCALIZATION performed by CHATA DELACRUZ at  OR     BREAST LUMPECTOMY, RT/LT  12/2/2010    Breast Lumpectomy RT/LT followed by [facility] TOTAL ABDOM HYSTERECTOMY  11/24/00    Hysterectomy, Total Abdominal, lysis of adhesions     COLONOSCOPY N/A 9/17/2019    Procedure: COLONOSCOPY, WITH POLYPECTOMY via snare;  Surgeon: Evelio Chavez DO;  Location:  GI     HC COLONOSCOPY W/WO BRUSH/WASH  08/03/00     HC REMOVAL OF OVARY/TUBE(S)  11/24/00    Salpingo-Oophorectomy, bilateral     HC REPAIR OF NASAL SEPTUM      correction of deviated nasal septum     Social History:  Patient is retired.  Reviewed and left in chart for clinician convenience.       Family History:  BCC in her mother and aunt  Reviewed and left in chart for clinician convenience.       Medications:  Current Outpatient Medications   Medication Sig Dispense Refill     azelaic acid (FINACIA) 15 % external gel Apply topically 2 times daily 50 g 0     CALCIUM-VITAMIN D PO Take 2 tablets by mouth 2 times daily.       cetirizine (ZYRTEC) 10 MG tablet Take 10 mg by mouth daily as needed       Cranberry 300 MG TABS Take 1 tablet by mouth 2 times daily.       doxycycline monohydrate (MONODOX) 100 MG capsule Take 1 capsule (100 mg) by mouth 2 times daily 60 capsule 2     metroNIDAZOLE (METROCREAM) 0.75 % external cream Apply thin layer to face once to twice daily. 45 g 11     metroNIDAZOLE (METROGEL) 0.75 % external gel Apply topically 2 times daily 45 g 11     multivitamin (CENTRUM SILVER) tablet Take 1 tablet by mouth daily       multivitamin (OCUVITE) TABS Take 1 tablet by mouth daily       permethrin (ELIMITE) 5 % external cream Apply thin layer to face once to twice daily. 60 g 11       Allergies   Allergen Reactions     Augmentin GI Disturbance     \"makes me very sick\"     Latex      Sulfa Drugs      Review of " Systems:  -Constitutional: Patient is otherwise feeling well, in usual state of health.   -Skin: As above in HPI. No additional skin concerns.    Physical exam:  Vitals: There were no vitals taken for this visit.  GEN: This is a well developed, well-nourished female in no acute distress, in a pleasant mood.    SKIN: Sun-exposed skin, which includes the head/face, neck, both arms, digits, and/or nails was examined.   - [place] Type II   - Slight red erythema on the cheeks and chin  - No papules, pustules, or granlomatous papules on the face today.   - No other lesions of concern on areas examined.     Impression/Plan:    1. Rosacea, previously granulomatous. Much improved after use of oral doxycycline for 3 months. Patient did not take as prescribed, but did still have good result with 100 mg doxycycline once daily and the use of  two topicals a day, rotating metronidazole 0.75% cream, azelaic acid 15% gel/cream, and permethrin 5% cream.      I recommend Vanicream as a moisturizer as well as Vanicream gentle facial cleanser      Continue topicals: Metrogel 0.75% gel, Azelaic Acid 15%, and permethrin 5% cream. Instructed to use two topicals per day (morning and night). Advised patient to try to rotate topicals.      Discontinue doxycycline starting today. Our hope is to maintain off of all oral antibiotics. In case she flares, she may take doxy 50mg pills she has on hand as needed for flares. Asked her to keep track of how much she is needing. If something isneeded daily, I would prefer extended release 40 mg Doxycycline daily.    CC Deedee Wilde MD on close of this encounter.  Follow-up in 3 months for rosacea, earlier for new or changing lesions.     Staff Involved:  Scribe/Staff    Scribe Disclosure  Meena Salinas LPN    Provider Disclosure:   The documentation recorded by the scribe accurately reflects the services I personally performed and the decisions made by me.    Candy Lugo,  MD    Department of Dermatology  [facility]: Phone: [phone], Fax:[phone]  [facility] [place]: Phone: [phone], Fax: [phone]                        Again, thank you for allowing me to participate in the care of your patient.        Sincerely,        Candy Lugo MD

## 2019-12-11 NOTE — PROGRESS NOTES
"[facility] Dermatology Note    Dermatology Problem List:  1. Rosacea, papular  -current t/x: Metrogel 0.75% gel, Azelaic Acid 15%, Permethrin cream.  -s/p doxycycline 50 mg daily for years - flared when attempted to stop. Did doxycycline 100 mg BID x3 months starting 9/2019.    Encounter Date: Dec 11, 2019    CC:  Chief Complaint   Patient presents with     RECHECK     Butch is returning with notable improvement     History of Present Illness:  Ms. Rosario \"BUTCH\" NICK Khan is returning for rosacea. She was last seen in Sept when plan was to start topicals and bridge with 3 months of oral doxycycline 100mg BID. Patient notes she did topicals as instructed, but did not do oral doxy as prescribed. She mostly took once daily. She took some time off entirely due to diagnosis of colon cancer and need for procedures. Despite this, her skin is doing great now. It has been a month since she had any blemishes. She wonders about what to do going further.    No other concerns addressed today.    Past Medical History:   Patient Active Problem List   Diagnosis     Persistent disorder of initiating or maintaining sleep     Phobia     Lobular carcinoma in situ     Intermittent asthma     Advanced directives, counseling/discussion     Rosacea     Past Medical History:   Diagnosis Date     Abnormal Papanicolaou smear of cervix and cervical HPV     Abn. Pap smear (cervix), age 24  s/p cryotherapy     Anxiety state, unspecified     anxiety with public speaking     Cough      Excessive or frequent menstruation      Other congenital anomaly of uterus     uterine fibroids     Rosacea 8/16/2011     Unspecified sinusitis (chronic)     Chronic sinusitis     Past Surgical History:   Procedure Laterality Date     BIOPSY BREAST NEEDLE LOCALIZATION  12/2/2010    BIOPSY BREAST NEEDLE LOCALIZATION performed by CHATA DELACRUZ at  OR     BREAST LUMPECTOMY, RT/LT  12/2/2010    Breast Lumpectomy RT/LT followed by [facility] " "    C TOTAL ABDOM HYSTERECTOMY  11/24/00    Hysterectomy, Total Abdominal, lysis of adhesions     COLONOSCOPY N/A 9/17/2019    Procedure: COLONOSCOPY, WITH POLYPECTOMY via snare;  Surgeon: Evelio Chavez DO;  Location: [facility]     HC COLONOSCOPY W/WO BRUSH/WASH  08/03/00     HC REMOVAL OF OVARY/TUBE(S)  11/24/00    Salpingo-Oophorectomy, bilateral     HC REPAIR OF NASAL SEPTUM      correction of deviated nasal septum     Social History:  Patient is retired.  Reviewed and left in chart for clinician convenience.       Family History:  BCC in her mother and aunt  Reviewed and left in chart for clinician convenience.       Medications:  Current Outpatient Medications   Medication Sig Dispense Refill     azelaic acid (FINACIA) 15 % external gel Apply topically 2 times daily 50 g 0     CALCIUM-VITAMIN D PO Take 2 tablets by mouth 2 times daily.       cetirizine (ZYRTEC) 10 MG tablet Take 10 mg by mouth daily as needed       Cranberry 300 MG TABS Take 1 tablet by mouth 2 times daily.       doxycycline monohydrate (MONODOX) 100 MG capsule Take 1 capsule (100 mg) by mouth 2 times daily 60 capsule 2     metroNIDAZOLE (METROCREAM) 0.75 % external cream Apply thin layer to face once to twice daily. 45 g 11     metroNIDAZOLE (METROGEL) 0.75 % external gel Apply topically 2 times daily 45 g 11     multivitamin (CENTRUM SILVER) tablet Take 1 tablet by mouth daily       multivitamin (OCUVITE) TABS Take 1 tablet by mouth daily       permethrin (ELIMITE) 5 % external cream Apply thin layer to face once to twice daily. 60 g 11       Allergies   Allergen Reactions     Augmentin GI Disturbance     \"makes me very sick\"     Latex      Sulfa Drugs      Review of Systems:  -Constitutional: Patient is otherwise feeling well, in usual state of health.   -Skin: As above in HPI. No additional skin concerns.    Physical exam:  Vitals: There were no vitals taken for this visit.  GEN: This is a well developed, well-nourished female in no " acute distress, in a pleasant mood.    SKIN: Sun-exposed skin, which includes the head/face, neck, both arms, digits, and/or nails was examined.   - [place] Type II   - Slight red erythema on the cheeks and chin  - No papules, pustules, or granlomatous papules on the face today.   - No other lesions of concern on areas examined.     Impression/Plan:    1. Rosacea, previously granulomatous. Much improved after use of oral doxycycline for 3 months. Patient did not take as prescribed, but did still have good result with 100 mg doxycycline once daily and the use of  two topicals a day, rotating metronidazole 0.75% cream, azelaic acid 15% gel/cream, and permethrin 5% cream.      I recommend Vanicream as a moisturizer as well as Vanicream gentle facial cleanser      Continue topicals: Metrogel 0.75% gel, Azelaic Acid 15%, and permethrin 5% cream. Instructed to use two topicals per day (morning and night). Advised patient to try to rotate topicals.      Discontinue doxycycline starting today. Our hope is to maintain off of all oral antibiotics. In case she flares, she may take doxy 50mg pills she has on hand as needed for flares. Asked her to keep track of how much she is needing. If something isneeded daily, I would prefer extended release 40 mg Doxycycline daily.    CC Deedee Wilde MD on close of this encounter.  Follow-up in 3 months for rosacea, earlier for new or changing lesions.     Staff Involved:  Scribe/Staff    Scribe Disclosure  Meena Salinas LPN    Provider Disclosure:   The documentation recorded by the scribe accurately reflects the services I personally performed and the decisions made by me.    Candy Lugo MD    Department of Dermatology  [facility]: Phone: [phone], Fax:[phone]  [facility]: Phone: [phone], Fax: [phone]

## 2019-12-11 NOTE — NURSING NOTE
Marlene Khan's goals for this visit include:   Chief Complaint   Patient presents with     RECHECK     Raquel is returning with notable improvement     She requests these members of her care team be copied on today's visit information:     PCP: Deedee Wilde    Referring Provider:  No referring provider defined for this encounter.    There were no vitals taken for this visit.    Do you need any medication refills at today's visit? No    Meena Salinas LPN

## 2019-12-16 PROBLEM — C18.9 MALIGNANT NEOPLASM OF COLON (H): Status: ACTIVE | Noted: 2019-10-01

## 2019-12-16 PROBLEM — K21.00 GASTROESOPHAGEAL REFLUX DISEASE WITH ESOPHAGITIS: Status: ACTIVE | Noted: 2019-10-01

## 2019-12-19 ENCOUNTER — TELEPHONE (OUTPATIENT)
Dept: SURGERY | Facility: CLINIC | Age: 69
End: 2019-12-19

## 2019-12-19 NOTE — TELEPHONE ENCOUNTER
Reason for Call:  Other call back    Detailed comments: Please call Pt as she has questions about what is going to happen with the followup from her surgery in Oct. She would like to talk to Scott herself if he can.     Phone Number Patient can be reached at: Home number on file [phone] (home)    Best Time: NA    Can we leave a detailed message on this number? YES    Call taken on 12/19/2019 at 11:50 AM by Kiara Byers

## 2019-12-19 NOTE — TELEPHONE ENCOUNTER
Evelio Chavez DO Koranda, Natalie, RN   Caller: Unspecified (Today, 11:50 AM)             My review of the images and colonoscopy report as well as the CT scan doesn't change anything we discussed. I do feel as though the distal (left) transverse colon is where the polyp was. There must have been some ink that stained a different portion of the colon but there was no other concerning polyp that I removed.          Informed patient. She feels better about this and will proceed with surgery as planned.   Maylin ALVARADO, Lead RN, BSN. . .  12/19/2019, 2:41 PM

## 2019-12-19 NOTE — TELEPHONE ENCOUNTER
"On 9/17 had colonscopy with Dr. Chavez - removed cancerous polyp, did tattooing in the distal transverse colon.      [place] attempted surgical resection, however they were not able to see the tattooing so they completed another colonoscopy while she was sedated but were not able to see the tattooing then either.     After her surgical incisions healed, on Tuesday they did repeat colonoscopy and found two areas of very light \"splattering\" of ink in both the distal transverse colon on the left and also on the right.  The spot on left (distal transverse colon) had a small lesion/mass within the light tattoo splattering.  The patient states that they are planning surgery for early January to remove the distal transverse colon but she wants to make sure that Dr. Chavez feels they are doing the right area as the light splattering in the right side makes her nervous.        Dr. Chavez will review this chart and let me know what to relay to the patient.      Patient sees Dr. Baldomero Conteh at the [place]. His  Jinny can be reached at [phone] if Dr. Chavez would like to discuss this with them or requests copies of the colonoscopy images.      Maylin ALVARADO, Lead RN, BSN. . .  12/19/2019, 2:11 PM    "

## 2020-10-12 ENCOUNTER — IMMUNIZATION (OUTPATIENT)
Dept: FAMILY MEDICINE | Facility: OTHER | Age: 70
End: 2020-10-12
Payer: MEDICARE

## 2020-10-12 DIAGNOSIS — Z23 NEED FOR PROPHYLACTIC VACCINATION AND INOCULATION AGAINST INFLUENZA: Primary | ICD-10-CM

## 2020-10-12 PROCEDURE — G0008 ADMIN INFLUENZA VIRUS VAC: HCPCS

## 2020-10-12 PROCEDURE — 90662 IIV NO PRSV INCREASED AG IM: CPT

## 2020-12-30 DIAGNOSIS — L71.9 ACNE ROSACEA: ICD-10-CM

## 2020-12-30 NOTE — LETTER
[facility]  [address]  [place] [address]  [phone]          January 6, 2021    Marlene Khan                                                                                                                     [id number] [place] [address]            Dear Marlene,    We are unable to refill your doxycycline without an in office or video visit since we have not seen you since 6/2019.    Please call to schedule     Sincerely,         Deedee Wilde MD

## 2020-12-31 DIAGNOSIS — L71.9 ROSACEA: ICD-10-CM

## 2020-12-31 NOTE — TELEPHONE ENCOUNTER
Medication: permethrim cream 5% and metronidazole cream 0.75%  Request from: fax (fax, call, my chart, other)    Pharmacy: [facility]    If from fax:  Last date filled: n/a  QTY: n/a    Date fax was received: 12/30/20

## 2021-01-04 NOTE — TELEPHONE ENCOUNTER
Pending Prescriptions:                       Disp   Refills    doxycycline ROSACEA (ORACEA) 40 MG DR caps*30 cap*2        Sig: Take 1 capsule by mouth once daily as needed for           rosacea.    Last Written Prescription Date:  12/11/19  Last Fill Quantity: 30,  # refills: 2   Last office visit: 6/21/2019 with prescribing provider:  Chani   Future Office Visit:      Routing refill request to provider for review/approval because:  A break in medication    Diana Darling RN on 1/4/2021 at 2:08 PM

## 2021-01-05 RX ORDER — PERMETHRIN 50 MG/G
CREAM TOPICAL
Qty: 60 G | Refills: 11 | OUTPATIENT
Start: 2021-01-05

## 2021-01-05 RX ORDER — DOXYCYCLINE 40 MG/1
CAPSULE ORAL
Qty: 30 CAPSULE | Refills: 2 | OUTPATIENT
Start: 2021-01-05

## 2021-01-05 NOTE — TELEPHONE ENCOUNTER
appt due for refill/ last derm 12/19/2019 with RTC 3 months. Scheduling has been notified to contact the pt for appointment.

## 2021-01-06 NOTE — TELEPHONE ENCOUNTER
1st attempt. [employer] message sent to patient.      Kely Wang  Surgical Specialties Procedure   [place]  1/6/2021 8:43 AM

## 2021-01-07 DIAGNOSIS — L71.9 ACNE ROSACEA: ICD-10-CM

## 2021-01-07 RX ORDER — DOXYCYCLINE 40 MG/1
CAPSULE ORAL
Qty: 30 CAPSULE | Refills: 2 | OUTPATIENT
Start: 2021-01-07

## 2021-01-07 NOTE — TELEPHONE ENCOUNTER
Sending to scheduling for outreach.  Patient needs visit with [facility] provider for any refills.  Evelyn Florez RN

## 2021-01-09 ENCOUNTER — HEALTH MAINTENANCE LETTER (OUTPATIENT)
Age: 71
End: 2021-01-09

## 2021-02-01 ENCOUNTER — MYC MEDICAL ADVICE (OUTPATIENT)
Dept: FAMILY MEDICINE | Facility: OTHER | Age: 71
End: 2021-02-01

## 2021-08-20 ENCOUNTER — TELEPHONE (OUTPATIENT)
Dept: GERIATRICS | Facility: CLINIC | Age: 71
End: 2021-08-20

## 2021-08-20 DIAGNOSIS — C18.9 MALIGNANT NEOPLASM OF COLON, UNSPECIFIED PART OF COLON (H): Primary | ICD-10-CM

## 2021-08-20 RX ORDER — HEPARIN SODIUM (PORCINE) LOCK FLUSH IV SOLN 100 UNIT/ML 100 UNIT/ML
5 SOLUTION INTRAVENOUS
Status: CANCELLED | OUTPATIENT
Start: 2021-08-20

## 2021-08-20 RX ORDER — HEPARIN SODIUM,PORCINE 10 UNIT/ML
5 VIAL (ML) INTRAVENOUS
Status: CANCELLED | OUTPATIENT
Start: 2021-08-20

## 2021-08-20 NOTE — TELEPHONE ENCOUNTER
Patient is calling because she has a port that was placed by [facility]. She is wondering if there is a place for her to get her port flushed?   This nurse informed her that infusion would most likely be the best option, as we do not do those in clinic.     REQUEST:    would you be able to place a referral for the patient to be seen by infusion? The patient states she probably needs it flushed within the next week or two.     Napoleon Sagastume, HAILEYN, RN, PHN  [place]  August 20, 2021

## 2021-08-20 NOTE — TELEPHONE ENCOUNTER
I placed infusion orders for port flush, I believe they will want her to have a Covid test 3 days prior to having it done.  I am not sure that they will call her for scheduling or not since this is a prn order, so please assist patient in scheduling this flush and find out if she does need Covid test prior (I placed the order in case she does)

## 2021-08-24 NOTE — TELEPHONE ENCOUNTER
This nurse called the patient to verify infusion location. Called [facility] infusion and the patient does not need a COVID test. Patient was scheduled, but time didn't work for her. Therefore, she called to r/s by calling [phone].     Napoleon Sagastume, HAILEYN, RN, N  [place]  August 24, 2021

## 2021-08-26 ENCOUNTER — LAB (OUTPATIENT)
Dept: ONCOLOGY | Facility: CLINIC | Age: 71
End: 2021-08-26
Payer: MEDICARE

## 2021-08-26 PROCEDURE — 96523 IRRIG DRUG DELIVERY DEVICE: CPT

## 2021-08-26 RX ORDER — HEPARIN SODIUM (PORCINE) LOCK FLUSH IV SOLN 100 UNIT/ML 100 UNIT/ML
5 SOLUTION INTRAVENOUS EVERY 8 HOURS
Status: DISCONTINUED | OUTPATIENT
Start: 2021-08-26 | End: 2021-08-26 | Stop reason: HOSPADM

## 2021-08-26 RX ADMIN — HEPARIN SODIUM (PORCINE) LOCK FLUSH IV SOLN 100 UNIT/ML 5 ML: 100 SOLUTION at 14:09

## 2021-08-26 NOTE — PROGRESS NOTES
Port needle placed for port flush access, Sterile technique performed and maintained. Patient tolerated procedure well.     Flushed with 10cc sterile saline with a positive blood return, flushed with 20cc normal saline and 5cc (100USP UNITS/ML) of heparin. Gauze dressing placed with use of paper tape.    RN advised patient to leave gauze in place for 20 mins.     Patient demonstrated verbal understanding.     Luly Patricia RN  [facility]

## 2021-10-23 ENCOUNTER — HEALTH MAINTENANCE LETTER (OUTPATIENT)
Age: 71
End: 2021-10-23

## 2021-11-24 DIAGNOSIS — L71.9 ROSACEA: ICD-10-CM

## 2021-11-24 NOTE — TELEPHONE ENCOUNTER
[facility] Call Center    Phone Message    May a detailed message be left on voicemail: yes     Reason for Call: Medication Refill Request    Has the patient contacted the pharmacy for the refill? Yes     Name of medication being requested:   metroNIDAZOLE (METROCREAM) 0.75 % external cream     Provider who prescribed the medication: Dr Rao    Pharmacy:   !! NEW PHARMACY!!  [facility] [place]    Date medication is needed: 12/3/21     Pt aware of 72-business hour turn around time on refill requests    Action Taken: Message routed to:  Adult Clinics: Dermatology p [id number]    Travel Screening: Not Applicable    Pt scheduled for first available appt with Dr Lugo on 1/27/21. Pt requests bridge of Rx until that time.     Please call Pt with questions. Thanks!

## 2021-11-24 NOTE — TELEPHONE ENCOUNTER
metroNIDAZOLE (METROCREAM) 0.75 % external cream       Last Written Prescription Date:  9-10-19  Last Fill Quantity: 45g ,   # refills: 11  Last Office Visit : 12-11-19  Future Office visit:  1-27-22  Derm process 1    Routing refill request to provider for review/approval because:  Next clinic appt out of RTC timeframe   ? RF til RTC

## 2021-11-24 NOTE — TELEPHONE ENCOUNTER
Received refill request as MICHAELA. Chart (including notes and pertinent labs) reviewed, refill appropriate. Attending Dr. Lugo CC'd as an FYI.      Carly Moy MD (PGY4)  Dermatology Resident

## 2022-02-12 ENCOUNTER — HEALTH MAINTENANCE LETTER (OUTPATIENT)
Age: 72
End: 2022-02-12

## 2022-06-30 NOTE — PROGRESS NOTES
[facility] Dermatology Note  Encounter Date: Jul 5, 2022  Office Visit     Dermatology Problem List:  1. Rosacea, papular  -current t/x: Metrogel 0.75% gel, Azelaic Acid 15%, Permethrin cream.  -s/p doxycycline 50 mg daily for years - flared when attempted to stop. Did doxycycline 100 mg BID x3 months starting 9/2019.    ____________________________________________    Assessment & Plan:    # Rosacea, previously granulomatous. Improved with doxycycline in the past. Today she has only erythema and 1 acneiform lesion. Okay to try to permethrin  - Continue use of gentle facial cleanser daily.  - Continue metronidazole 0.75% daily  - OK to discontinue permethrin. Recommend restarting if a flare recurs.  - Continue permethrin 5% cream weekly  - Future consideration: Rhofade, PDL    # 4 cm x 2 cm speckled patch on the right lateral posterior hip. Clinically c/w nevus spilus.I offered biopsy for skin cancer and this was declined. She does not recall it and I reviewed that normally with nevus spilus patients are aware. That is why I offered biopsy.  She understood.   - Photograph was obtained for clinical monitoring and inclusion in medical record. Discussed with patient we cannot rule out malignancy without biopsy, therefore biopsy recommended. Patient declines. Will clinically monitor at this time.    # Cyst, left clavicle. Reassured of  nature. Reviewed options for removal, patient defers at this time.  - declines excision  -recheck to make sure still calm at follow up    # Seborrheic dermatitis. Mild. Scalp, possibly from olaplex(asked to hold)   - Apply clobex shampoo once daily x 1 week, then once weekly.   - Recommend trial of a new shampoo due to possible irritation.    # Solar lentigines.   - No further intervention needed.     Procedures Performed:   None.    Follow-up: 3-4 month(s) in-person, or earlier for new or changing lesions    Staff and Scribe:     Scribe Disclosure:   Dario RUELAS  Stewart, am serving as a scribe to document services personally performed by this physician, Dr. Ava Rao, based on data collection and the provider's statements to me.     Provider Disclosure:   The documentation recorded by the scribe accurately reflects the services I personally performed and the decisions made by me.    Ava Rao MD    Department of Dermatology  [facility]: Phone: [phone], Fax:[phone]  [facility]: Phone: [phone], Fax: [phone]      ____________________________________________    CC: Skin Check (Area of concern: left clavicle no personal hx skin cancer and family hx NMSC in mother ) and Rosacea (Currently using cetaphil wash and moisturizer, metronidazole and permethrin; patient is not taking the doxycycline : patient wondering if she should be changing up the medication types or frequency )    HPI:  Ms. Marlene Khan is a(n) 71 year old female who presents today as a return patient for a skin check.    Last seen on 12/11/19 with Dr. Lugo for rosacea. At that time, patient to continue Metrogel 0.75% gel, Azelaic Acid 15%, and permethrin 5% cream.     Today, she has an area of concern on the left clavicle. She also notes her ears have been itchy. She uses Olaplex shampoo, which has been used over the past year.    She uses permethrin once weekly and metrogel nightly. She is curious if she could try different products, as she is unsure how necessary this treatment regiment is.    Patient is otherwise feeling well, without additional skin concerns.    Labs Reviewed:  N/A    Physical Exam:  Vitals: There were no vitals taken for this visit.  SKIN: Full skin, which includes the head/face, both arms, chest, back, abdomen,both legs, genitalia and/or groin buttocks, digits and/or nails, was examined.  - One acneiform papule on the right chin  -  Erythema of the cheeks and chin  - 4 mm subcutaneous nodule near the left clavicle  - Perifollicular erythema of the scalp.   - Scattered brown macules on sun exposed areas.  - 4 cm x 2 cm speckled patch on the right lateral posterior hip. Clinically c/w nevus spilus.    - No other lesions of concern on areas examined.     Medications:  Current Outpatient Medications   Medication     azelaic acid (FINACIA) 15 % external gel     CALCIUM-VITAMIN D PO     cetirizine (ZYRTEC) 10 MG tablet     Cranberry 300 MG TABS     doxycycline ROSACEA (ORACEA) 40 MG DR capsule     metroNIDAZOLE (METROCREAM) 0.75 % external cream     metroNIDAZOLE (METROGEL) 0.75 % external gel     multivitamin (CENTRUM SILVER) tablet     multivitamin (OCUVITE) TABS     permethrin (ELIMITE) 5 % external cream     No current facility-administered medications for this visit.      Past Medical History:   Patient Active Problem List   Diagnosis     Persistent disorder of initiating or maintaining sleep     Phobia     Lobular carcinoma in situ     Intermittent asthma     Advanced directives, counseling/discussion     Rosacea     Malignant neoplasm of colon (H)     Gastroesophageal reflux disease with esophagitis     Past Medical History:   Diagnosis Date     Abnormal Papanicolaou smear of cervix and cervical HPV     Abn. Pap smear (cervix), age 24  s/p cryotherapy     Anxiety state, unspecified     anxiety with public speaking     Cough      Excessive or frequent menstruation      Other congenital anomaly of uterus     uterine fibroids     Rosacea 8/16/2011     Unspecified sinusitis (chronic)     Chronic sinusitis        CC Referred Self, MD  No address on file on close of this encounter.

## 2022-07-05 ENCOUNTER — OFFICE VISIT (OUTPATIENT)
Dept: DERMATOLOGY | Facility: CLINIC | Age: 72
End: 2022-07-05
Payer: MEDICARE

## 2022-07-05 DIAGNOSIS — L30.9 DERMATITIS: ICD-10-CM

## 2022-07-05 DIAGNOSIS — L71.9 ROSACEA: Primary | ICD-10-CM

## 2022-07-05 DIAGNOSIS — L72.9 CYST OF SKIN: ICD-10-CM

## 2022-07-05 PROCEDURE — 99214 OFFICE O/P EST MOD 30 MIN: CPT | Performed by: DERMATOLOGY

## 2022-07-05 RX ORDER — PERMETHRIN 50 MG/G
CREAM TOPICAL
Qty: 60 G | Refills: 11 | Status: SHIPPED | OUTPATIENT
Start: 2022-07-05 | End: 2022-11-03

## 2022-07-05 RX ORDER — CLOBETASOL PROPIONATE 0.05 G/100ML
SHAMPOO TOPICAL
Qty: 60 ML | Refills: 3 | Status: SHIPPED | OUTPATIENT
Start: 2022-07-05 | End: 2022-07-08

## 2022-07-05 ASSESSMENT — PAIN SCALES - GENERAL: PAINLEVEL: NO PAIN (0)

## 2022-07-05 NOTE — LETTER
7/5/2022         RE: Marlene Khan  [address]  [facility] [address]        Dear Colleague,    Thank you for referring your patient, Marlene Khan, to the [facility]. Please see a copy of my visit note below.    [facility] Dermatology Note  Encounter Date: Jul 5, 2022  Office Visit     Dermatology Problem List:  1. Rosacea, papular  -current t/x: Metrogel 0.75% gel, Azelaic Acid 15%, Permethrin cream.  -s/p doxycycline 50 mg daily for years - flared when attempted to stop. Did doxycycline 100 mg BID x3 months starting 9/2019.    ____________________________________________    Assessment & Plan:    # Rosacea, previously granulomatous. Improved with doxycycline in the past. Today she has only erythema and 1 acneiform lesion. Okay to try to permethrin  - Continue use of gentle facial cleanser daily.  - Continue metronidazole 0.75% daily  - OK to discontinue permethrin. Recommend restarting if a flare recurs.  - Continue permethrin 5% cream weekly  - Future consideration: Rhofade, PDL    # 4 cm x 2 cm speckled patch on the right lateral posterior hip. Clinically c/w nevus spilus.I offered biopsy for skin cancer and this was declined. She does not recall it and I reviewed that normally with nevus spilus patients are aware. That is why I offered biopsy.  She understood.   - Photograph was obtained for clinical monitoring and inclusion in medical record. Discussed with patient we cannot rule out malignancy without biopsy, therefore biopsy recommended. Patient declines. Will clinically monitor at this time.    # Cyst, left clavicle. Reassured of  nature. Reviewed options for removal, patient defers at this time.  - declines excision  -recheck to make sure still calm at follow up    # Seborrheic dermatitis. Mild. Scalp, possibly from olaplex(asked to hold)   - Apply clobex shampoo once daily x 1 week, then once weekly.   - Recommend trial of a new shampoo due  to possible irritation.    # Solar lentigines.   - No further intervention needed.     Procedures Performed:   None.    Follow-up: 3-4 month(s) in-person, or earlier for new or changing lesions    Staff and Scribe:     Scribe Disclosure:   I, Dario William, am serving as a scribe to document services personally performed by this physician, Dr. Ava Rao, based on data collection and the provider's statements to me.     Provider Disclosure:   The documentation recorded by the scribe accurately reflects the services I personally performed and the decisions made by me.    Ava Rao MD    Department of Dermatology  [facility] [place]: Phone: [phone], Fax:[phone]  [facility]: Phone: [phone], Fax: [phone]      ____________________________________________    CC: Skin Check (Area of concern: left clavicle no personal hx skin cancer and family hx NMSC in mother ) and Rosacea (Currently using cetaphil wash and moisturizer, metronidazole and permethrin; patient is not taking the doxycycline : patient wondering if she should be changing up the medication types or frequency )    HPI:  Ms. Marlene Khan is a(n) 71 year old female who presents today as a return patient for a skin check.    Last seen on 12/11/19 with Dr. Lugo for rosacea. At that time, patient to continue Metrogel 0.75% gel, Azelaic Acid 15%, and permethrin 5% cream.     Today, she has an area of concern on the left clavicle. She also notes her ears have been itchy. She uses Olaplex shampoo, which has been used over the past year.    She uses permethrin once weekly and metrogel nightly. She is curious if she could try different products, as she is unsure how necessary this treatment regiment is.    Patient is otherwise feeling well, without additional skin concerns.    Labs Reviewed:  N/A    Physical  Exam:  Vitals: There were no vitals taken for this visit.  SKIN: Full skin, which includes the head/face, both arms, chest, back, abdomen,both legs, genitalia and/or groin buttocks, digits and/or nails, was examined.  - One acneiform papule on the right chin  - Erythema of the cheeks and chin  - 4 mm subcutaneous nodule near the left clavicle  - Perifollicular erythema of the scalp.   - Scattered brown macules on sun exposed areas.  - 4 cm x 2 cm speckled patch on the right lateral posterior hip. Clinically c/w nevus spilus.    - No other lesions of concern on areas examined.     Medications:  Current Outpatient Medications   Medication     azelaic acid (FINACIA) 15 % external gel     CALCIUM-VITAMIN D PO     cetirizine (ZYRTEC) 10 MG tablet     Cranberry 300 MG TABS     doxycycline ROSACEA (ORACEA) 40 MG DR capsule     metroNIDAZOLE (METROCREAM) 0.75 % external cream     metroNIDAZOLE (METROGEL) 0.75 % external gel     multivitamin (CENTRUM SILVER) tablet     multivitamin (OCUVITE) TABS     permethrin (ELIMITE) 5 % external cream     No current facility-administered medications for this visit.      Past Medical History:   Patient Active Problem List   Diagnosis     Persistent disorder of initiating or maintaining sleep     Phobia     Lobular carcinoma in situ     Intermittent asthma     Advanced directives, counseling/discussion     Rosacea     Malignant neoplasm of colon (H)     Gastroesophageal reflux disease with esophagitis     Past Medical History:   Diagnosis Date     Abnormal Papanicolaou smear of cervix and cervical HPV     Abn. Pap smear (cervix), age 24  s/p cryotherapy     Anxiety state, unspecified     anxiety with public speaking     Cough      Excessive or frequent menstruation      Other congenital anomaly of uterus     uterine fibroids     Rosacea 8/16/2011     Unspecified sinusitis (chronic)     Chronic sinusitis        CC Referred Self, MD  No address on file on close of this  encounter.      Again, thank you for allowing me to participate in the care of your patient.        Sincerely,        Ava Rao MD

## 2022-07-05 NOTE — NURSING NOTE
Marlene Khan's goals for this visit include:   Chief Complaint   Patient presents with     Skin Check     Area of concern: left clavicle no personal hx skin cancer and family hx NMSC in mother      Rosacea     Currently using cetaphil wash and moisturizer, metronidazole and permethrin; patient is not taking the doxycycline : patient wondering if she should be changing up the medication types or frequency        She requests these members of her care team be copied on today's visit information:     PCP: Deedee Wilde    Referring Provider:  Referred Self, MD  No address on file    There were no vitals taken for this visit.    Do you need any medication refills at today's visit? Danielle Malone LPN

## 2022-07-05 NOTE — PATIENT INSTRUCTIONS
Hold permethrin for 1 month, if goes well then stop. Refill provided if needed    Recheck spot on thigh in 3 months, if you change your mind and wants biopsy, left nurse know    For scalp, try new shampoo    Try head and shoulders classic line OR go back to old shampoo line      Patient Education     Checking for Skin Cancer  You can find cancer early by checking your skin each month. There are 3 kinds of skin cancer. They are melanoma, basal cell carcinoma, and squamous cell carcinoma. Doing monthly skin checks is the best way to find new marks or skin changes. Follow the instructions below for checking your skin.   The ABCDEs of checking moles for melanoma   Check your moles or growths for signs of melanoma using ABCDE:   Asymmetry: the sides of the mole or growth don t match  Border: the edges are ragged, notched, or blurred  Color: the color within the mole or growth varies  Diameter: the mole or growth is larger than 6 mm (size of a pencil eraser)  Evolving: the size, shape, or color of the mole or growth is changing (evolving is not shown in the images below)    Checking for other types of skin cancer  Basal cell carcinoma or squamous cell carcinoma have symptoms such as:     A spot or mole that looks different from all other marks on your skin  Changes in how an area feels, such as itching, tenderness, or pain  Changes in the skin's surface, such as oozing, bleeding, or scaliness  A sore that does not heal  New swelling or redness beyond the border of a mole    Who s at risk?  Anyone can get skin cancer. But you are at greater risk if you have:   Fair skin, light-colored hair, or light-colored eyes  Many moles or abnormal moles on your skin  A history of sunburns from sunlight or tanning beds  A family history of skin cancer  A history of exposure to radiation or chemicals  A weakened immune system  If you have had skin cancer in the past, you are at risk for recurring skin cancer.   How to check your  skin  Do your monthly skin checkups in front of a full-length mirror. Check all parts of your body, including your:   Head (ears, face, neck, and scalp)  Torso (front, back, and sides)  Arms (tops, undersides, upper, and lower armpits)  Hands (palms, backs, and fingers, including under the nails)  Buttocks and genitals  Legs (front, back, and sides)  Feet (tops, soles, toes, including under the nails, and between toes)  If you have a lot of moles, take digital photos of them each month. Make sure to take photos both up close and from a distance. These can help you see if any moles change over time.   Most skin changes are not cancer. But if you see any changes in your skin, call your doctor right away. Only he or she can diagnose a problem. If you have skin cancer, seeing your doctor can be the first step toward getting the treatment that could save your life.   [employer] last reviewed this educational content on 4/1/2019 2000-2020 [employer]. [address] [place] [address]. All rights reserved. This information is not intended as a substitute for professional medical care. Always follow your healthcare professional's instructions.       When should I call my doctor?  If you are worsening or not improving, please, contact us or seek urgent care as noted below.     Who should I call with questions (adults)?  [facility] (adult and pediatric): [phone]  [facility] (adult): [phone]  For urgent needs outside of business hours call the [facility] at [phone] and ask for the dermatology resident on call to be paged  If this is a medical emergency and you are unable to reach an ER, Call [phone]    Who should I call with questions (pediatric)?  [facility]- Pediatric Dermatology  Dr. Daniela Rico, Dr. Francisco Magallanes, Dr. Pamela Ballesteros, ROSA Hall, Dr. Alethea De Leon, Dr. Muñoz  Vivien & Dr. Baldomero Desai  Non-urgent nurse triage line; [phone]- Columba and Marley RN Care Coordinators   Pati (/Complex ) [phone]    If you need a prescription refill, please contact your pharmacy. Refills are approved or denied by our Physicians during normal business hours, Monday through Fridays  Per office policy, refills will not be granted if you have not been seen within the past year (or sooner depending on your child's condition)    Scheduling Information:  Pediatric Appointment Scheduling and Call Center [phone]  Radiology Scheduling- [phone]  Sedation Unit Scheduling- [phone]  [place] Scheduling- General [phone]; Pediatric Dermatology [phone]  Main  Services: [phone]  Luxembourgish: [phone]  Australian: [phone]  Hmong/Senegalese/Phil: [phone]  Preadmission Nursing Department Fax Number: [phone] (Fax all pre-operative paperwork to this number)    For urgent matters arising during evenings, weekends, or holidays that cannot wait for normal business hours please call [phone] and ask for the dermatology resident on call to be paged.         Sun screens with Iron Oxide      Skinceuticals sunscreen, fusion, carried at the [facility] or can be mailed to you by [place] pharmacy by calling [facility]:Call [phone] and ask to have product shipped to you.     Supergoop 100% Mineral CC cream 50    [employer] Sahara Tinted Face Mineral sunscreen 60    ISDIN Eryfotona Ageless Ultralight Tinted Mineral

## 2022-07-08 ENCOUNTER — MYC MEDICAL ADVICE (OUTPATIENT)
Dept: DERMATOLOGY | Facility: CLINIC | Age: 72
End: 2022-07-08

## 2022-07-08 DIAGNOSIS — L30.9 DERMATITIS: ICD-10-CM

## 2022-07-08 RX ORDER — CLOBETASOL PROPIONATE 0.05 G/100ML
SHAMPOO TOPICAL
Qty: 60 ML | Refills: 3 | Status: SHIPPED | OUTPATIENT
Start: 2022-07-08 | End: 2022-11-03

## 2022-07-08 NOTE — TELEPHONE ENCOUNTER
Rx signed by Dr Caba and chi sent to patient advising how she would like to pick it up.  Waiting on patient reply  Constance Marlow RN

## 2022-07-08 NOTE — TELEPHONE ENCOUNTER
Please see message from patient and staff.  RN reprinted the Rx just needs MD signature.  Constance Marlow RN

## 2022-10-09 ENCOUNTER — HEALTH MAINTENANCE LETTER (OUTPATIENT)
Age: 72
End: 2022-10-09

## 2022-11-03 ENCOUNTER — MYC MEDICAL ADVICE (OUTPATIENT)
Dept: DERMATOLOGY | Facility: CLINIC | Age: 72
End: 2022-11-03

## 2022-11-03 ENCOUNTER — OFFICE VISIT (OUTPATIENT)
Dept: DERMATOLOGY | Facility: CLINIC | Age: 72
End: 2022-11-03
Payer: MEDICARE

## 2022-11-03 DIAGNOSIS — L82.1 SEBORRHEIC KERATOSIS: Primary | ICD-10-CM

## 2022-11-03 DIAGNOSIS — L71.9 ROSACEA: ICD-10-CM

## 2022-11-03 DIAGNOSIS — L82.1 SEBORRHEIC KERATOSIS: ICD-10-CM

## 2022-11-03 DIAGNOSIS — L84 CORN: ICD-10-CM

## 2022-11-03 PROCEDURE — 99214 OFFICE O/P EST MOD 30 MIN: CPT | Performed by: DERMATOLOGY

## 2022-11-03 RX ORDER — FLUOCINOLONE ACETONIDE 0.1 MG/ML
SOLUTION TOPICAL
Qty: 60 ML | Refills: 3 | Status: SHIPPED | OUTPATIENT
Start: 2022-11-03 | End: 2022-11-07

## 2022-11-03 RX ORDER — PERMETHRIN 50 MG/G
CREAM TOPICAL
Qty: 60 G | Refills: 11 | Status: SHIPPED | OUTPATIENT
Start: 2022-11-03 | End: 2022-11-07

## 2022-11-03 NOTE — NURSING NOTE
Marlene Khan's goals for this visit include:   Chief Complaint   Patient presents with     RECHECK     Recheck speckled patch on right lateral posterior hip. Patient would also like to have provider look at callous' on bilateral feet and discuss SPF of sunscreen for winter.      She requests these members of her care team be copied on today's visit information:     PCP: Deedee Wilde    Referring Provider:  No referring provider defined for this encounter.    There were no vitals taken for this visit.    Do you need any medication refills at today's visit? No  Arielle Marte, MODESTA

## 2022-11-03 NOTE — PATIENT INSTRUCTIONS
[facility] Dermatology Visit    Thank you for allowing us to participate in your care. Your findings, instructions and follow-up plan are as follows:         When should I call my doctor?  If you are worsening or not improving, please, contact us or seek urgent care as noted below.     Who should I call with questions (adults)?  [facility] (adult and pediatric): [phone]  [facility] (adult): [phone]  For urgent needs outside of business hours call the [facility] at [phone] and ask for the dermatology resident on call  If this is a medical emergency and you are unable to reach an ER, Call [phone]    Who should I call with questions (pediatric)?  [facility]- Pediatric Dermatology  Dr. Daniela Rico, Dr. Francisco Magallanes, Dr. Pamela Ballesteros, Cristin Nails, PA  Dr. Alethea De Leon, Dr. Wanda Puga & Dr. Baldomero Desai  Non Urgent  Nurse Triage Line; [phone]- Columba and Marley RN Care Coordinators   Pati (/Complex ) [phone]    If you need a prescription refill, please contact your pharmacy. Refills are approved or denied by our physicians during normal business hours, Monday through Fridays  Per office policy, refills will not be granted if you have not been seen within the past year (or sooner depending on your child's condition).    Scheduling Information:  Pediatric Appointment Scheduling and Call Center [phone]  Radiology Scheduling- [phone]  Sedation Unit Scheduling- [phone]  [place] Scheduling- General [phone]; Pediatric Dermatology [phone]  Main  Services: [phone]  [place] Islander: [phone]  East Timorese: [phone]  Hmong/Romie/Bahamian: [phone]  Preadmission Nursing Department Fax Number: [phone] (fax all pre-operative paperwork to this number)    For urgent matters arising during evenings, weekends, or  holidays that cannot wait for normal business hours please call [phone] and ask for the dermatology resident on call to be paged.     Topical Rogaine (Minoxidil) for Pattern Hair Loss    Minoxidil is an FDA approved over the counter topical for the treatment of hair loss and thinning hair in men and women.   Initially a 2% solution was available however this required application twice daily. A 5% solution is also now approved, only requiring application once per day.     Available Products:   Rogaine 5% solution: Packaged for men however can be used by men or women. Use dropper and apply directly to scalp at bedtime. This product can cause an allergy because of presence of propylene glycol. Stop this product if you develop a rash or itching and contact your physician.   Rogaine 5% foam: Packaged for men and women: Apply foam directly to the scalp once daily. This is less greasy compared to the solution. This formula is preferred for those who had a reaction to the solution product. If you develop rash or itching, stop the product and contact your physician.     What if I stop minoxidil topical?   After stopping minoxidil the hair will return to the usual pattern of thinning. Using the product 3-4 times per week is better than not using product at all.       Can I use generic minoxidil?   Yes, look for 5% minoxidil.     What are the side effects?  The most common side effect is rash or itching of the scalp. This can occur if a contact allergy develops with propylene glycol. A small group of patients noticed the appearance of facial hair if the product runs onto the face or with prolonged use. Keep it away from the face.  This can cause temporary shedding. Please, call us if this happens.  This can irritated the scalp. Please, call us if this happens.  Stop this product if you become pregnant or are breastfeeding      Last updated: 11/2/2021

## 2022-11-03 NOTE — LETTER
11/3/2022         RE: Marlene Khan  [address]  [facility] [address]        Dear Colleague,    Thank you for referring your patient, Marlene Khan, to the [facility]. Please see a copy of my visit note below.    [facility] Dermatology Note  Encounter Date: Nov 3, 2022  Office Visit     Dermatology Problem List:  1. Rosacea, papular  - Current tx: Metrogel 0.75% gel,  Permethrin cream.  - s/p doxycycline 50 mg daily for years - flared when attempted to stop. Did doxycycline 100 mg BID x3 months starting 9/2019.azelaic acid  2. Dermatitis  -clobex shampoo to expensive     ____________________________________________     Assessment & Plan:     # Rosacea, previously granulomatous. Improved with doxycycline in the past. Today she has only erythema and 1 acneiform lesion. Okay to use permethrin for flares. Today, she is clear  - Continue use of gentle facial cleanser daily.   - Continue metronidazole 0.75% daily  - OK to continue permethrin 5% cream as needed. Recommend restarting if a flare recurs. Refilled.  - Apply synalar to the face.  - Future consideration: Rhofade, PDL      # 4 cm x 2 cm speckled patch on the right lateral posterior hip. Unchanged from prior photograph. Explained of low likelihood for malignancy due to unchanged nature in 6 months. Patient elects to continue monitoring. declnes biopsy. Understands histopath is more definitive.   - Monitor for changes at home, recheck on follow up.     # Corns - bilateral great toes. Reviewed this is caused by improper fitted shoes and constant rubbing, recommended corn pads and urea cream for topical treatment, rechecking shoe fit.   - Apply urea cream 40% to the calluses PRN, script provided. Wash hands after use  -refit shoes  - Okay to use pumice stone  - Recommended OTC corn pads     #female pattern hair loss  --start minoxidil 5% foam or solution once daily. Keep away from face, counseled on  risk of irritation and hair growth on face, risk of temporary shedfing. Keep product away from face. Stop if you become pregnant. Handout provided    Procedures Performed:   None.    Follow-up: 1 years, earlier for new or changing lesions    Staff and Scribe:     Scribe Disclosure:   I, Rogelio Hamlin, am serving as a scribe to document services personally performed by this physician, Dr. Ava Rao, based on data collection and the provider's statements to me.       Provider Disclosure:   The documentation recorded by the scribe accurately reflects the services I personally performed and the decisions made by me.    Ava Rao MD    Department of Dermatology  [facility]: Phone: [phone], Fax:[phone]  [facility]: Phone: [phone], Fax: [phone]    ____________________________________________    CC: RECHECK (Recheck speckled patch on right lateral posterior hip. Patient would also like to have provider look at callous' on bilateral feet and discuss SPF of sunscreen for winter. )    HPI:  Ms. Marlene Khan is a(n) 71 year old female who presents today as a return patient for a spot check.    Last seen 7/5/22 for a skin check. At that time, 2 lesions were noted for monitoring and patient was instructed to continue metronidazole 0.75% daily and continue permethrin 5% cream weekly.     Today, she would like a patch on the right lateral hip rechecked. She would also like calluses on the feet evaluated. She notes that the rosacea is doing well.     Notes hair thinning.     Patient is otherwise feeling well, without additional skin concerns.    Labs Reviewed:  N/A    Physical Exam:  Vitals: There were no vitals taken for this visit.  SKIN: Focused examination of face, right hip, feet was performed.  - Face is clear.  - Hyperkeratosis on the bilateral great toes  - no acne  on  the face  -thinning on the rogaine  -no changes in patch on hip when compared to photo, speckeled  - No other lesions of concern on areas examined.     Medications:  Current Outpatient Medications   Medication     azelaic acid (FINACIA) 15 % external gel     CALCIUM-VITAMIN D PO     cetirizine (ZYRTEC) 10 MG tablet     clobetasol propionate (CLOBEX) 0.05 % external shampoo     Cranberry 300 MG TABS     doxycycline ROSACEA (ORACEA) 40 MG DR capsule     metroNIDAZOLE (METROCREAM) 0.75 % external cream     metroNIDAZOLE (METROGEL) 0.75 % external gel     multivitamin (CENTRUM SILVER) tablet     multivitamin (OCUVITE) TABS     permethrin (ELIMITE) 5 % external cream     No current facility-administered medications for this visit.      Past Medical History:   Patient Active Problem List   Diagnosis     Persistent disorder of initiating or maintaining sleep     Phobia     Lobular carcinoma in situ     Intermittent asthma     Advanced directives, counseling/discussion     Rosacea     Malignant neoplasm of colon (H)     Gastroesophageal reflux disease with esophagitis     Past Medical History:   Diagnosis Date     Abnormal Papanicolaou smear of cervix and cervical HPV     Abn. Pap smear (cervix), age 24  s/p cryotherapy     Anxiety state, unspecified     anxiety with public speaking     Cough      Excessive or frequent menstruation      Other congenital anomaly of uterus     uterine fibroids     Rosacea 8/16/2011     Unspecified sinusitis (chronic)     Chronic sinusitis        CC No referring provider defined for this encounter. on close of this encounter.       Again, thank you for allowing me to participate in the care of your patient.        Sincerely,        Ava Rao MD

## 2022-11-03 NOTE — PROGRESS NOTES
[facility] Dermatology Note  Encounter Date: Nov 3, 2022  Office Visit     Dermatology Problem List:  1. Rosacea, papular  - Current tx: Metrogel 0.75% gel,  Permethrin cream.  - s/p doxycycline 50 mg daily for years - flared when attempted to stop. Did doxycycline 100 mg BID x3 months starting 9/2019.azelaic acid  2. Dermatitis  -clobex shampoo to expensive     ____________________________________________     Assessment & Plan:     # Rosacea, previously granulomatous. Improved with doxycycline in the past. Today she has only erythema and 1 acneiform lesion. Okay to use permethrin for flares. Today, she is clear  - Continue use of gentle facial cleanser daily.   - Continue metronidazole 0.75% daily  - OK to continue permethrin 5% cream as needed. Recommend restarting if a flare recurs. Refilled.  - Apply synalar to the face.  - Future consideration: Rhofade, PDL      # 4 cm x 2 cm speckled patch on the right lateral posterior hip. Unchanged from prior photograph. Explained of low likelihood for malignancy due to unchanged nature in 6 months. Patient elects to continue monitoring. declnes biopsy. Understands histopath is more definitive.   - Monitor for changes at home, recheck on follow up.     # Corns - bilateral great toes. Reviewed this is caused by improper fitted shoes and constant rubbing, recommended corn pads and urea cream for topical treatment, rechecking shoe fit.   - Apply urea cream 40% to the calluses PRN, script provided. Wash hands after use  -refit shoes  - Okay to use pumice stone  - Recommended OTC corn pads     #female pattern hair loss  --start minoxidil 5% foam or solution once daily. Keep away from face, counseled on risk of irritation and hair growth on face, risk of temporary shedfing. Keep product away from face. Stop if you become pregnant. Handout provided    Procedures Performed:   None.    Follow-up: 1 years, earlier for new or changing lesions    Staff and Scribe:      Scribe Disclosure:   I, Rogelio Hamlin, am serving as a scribe to document services personally performed by this physician, Dr. Ava Rao, based on data collection and the provider's statements to me.       Provider Disclosure:   The documentation recorded by the scribe accurately reflects the services I personally performed and the decisions made by me.    Ava Rao MD    Department of Dermatology  [facility]: Phone: [phone], Fax:[phone]  [facility]: Phone: [phone], Fax: [phone]    ____________________________________________    CC: RECHECK (Recheck speckled patch on right lateral posterior hip. Patient would also like to have provider look at callous' on bilateral feet and discuss SPF of sunscreen for winter. )    HPI:  Ms. Marlene Khan is a(n) 71 year old female who presents today as a return patient for a spot check.    Last seen 7/5/22 for a skin check. At that time, 2 lesions were noted for monitoring and patient was instructed to continue metronidazole 0.75% daily and continue permethrin 5% cream weekly.     Today, she would like a patch on the right lateral hip rechecked. She would also like calluses on the feet evaluated. She notes that the rosacea is doing well.     Notes hair thinning.     Patient is otherwise feeling well, without additional skin concerns.    Labs Reviewed:  N/A    Physical Exam:  Vitals: There were no vitals taken for this visit.  SKIN: Focused examination of face, right hip, feet was performed.  - Face is clear.  - Hyperkeratosis on the bilateral great toes  - no acne  on the face  -thinning on the rogaine  -no changes in patch on hip when compared to photo, speckeled  - No other lesions of concern on areas examined.     Medications:  Current Outpatient Medications   Medication     azelaic acid (FINACIA) 15 % external gel      CALCIUM-VITAMIN D PO     cetirizine (ZYRTEC) 10 MG tablet     clobetasol propionate (CLOBEX) 0.05 % external shampoo     Cranberry 300 MG TABS     doxycycline ROSACEA (ORACEA) 40 MG DR capsule     metroNIDAZOLE (METROCREAM) 0.75 % external cream     metroNIDAZOLE (METROGEL) 0.75 % external gel     multivitamin (CENTRUM SILVER) tablet     multivitamin (OCUVITE) TABS     permethrin (ELIMITE) 5 % external cream     No current facility-administered medications for this visit.      Past Medical History:   Patient Active Problem List   Diagnosis     Persistent disorder of initiating or maintaining sleep     Phobia     Lobular carcinoma in situ     Intermittent asthma     Advanced directives, counseling/discussion     Rosacea     Malignant neoplasm of colon (H)     Gastroesophageal reflux disease with esophagitis     Past Medical History:   Diagnosis Date     Abnormal Papanicolaou smear of cervix and cervical HPV     Abn. Pap smear (cervix), age 24  s/p cryotherapy     Anxiety state, unspecified     anxiety with public speaking     Cough      Excessive or frequent menstruation      Other congenital anomaly of uterus     uterine fibroids     Rosacea 8/16/2011     Unspecified sinusitis (chronic)     Chronic sinusitis        CC No referring provider defined for this encounter. on close of this encounter.

## 2022-11-07 RX ORDER — FLUOCINOLONE ACETONIDE 0.1 MG/ML
SOLUTION TOPICAL
Qty: 60 ML | Refills: 3 | Status: SHIPPED | OUTPATIENT
Start: 2022-11-07 | End: 2022-11-11

## 2022-11-07 RX ORDER — PERMETHRIN 50 MG/G
CREAM TOPICAL
Qty: 60 G | Refills: 11 | Status: SHIPPED | OUTPATIENT
Start: 2022-11-07 | End: 2022-11-11

## 2022-11-11 RX ORDER — FLUOCINOLONE ACETONIDE 0.1 MG/ML
SOLUTION TOPICAL
Qty: 60 ML | Refills: 3 | Status: SHIPPED | OUTPATIENT
Start: 2022-11-11 | End: 2023-11-03

## 2022-11-11 RX ORDER — PERMETHRIN 50 MG/G
CREAM TOPICAL
Qty: 60 G | Refills: 11 | Status: SHIPPED | OUTPATIENT
Start: 2022-11-11 | End: 2023-11-03

## 2023-02-18 ENCOUNTER — HEALTH MAINTENANCE LETTER (OUTPATIENT)
Age: 73
End: 2023-02-18

## 2023-11-03 NOTE — PROGRESS NOTES
[facility] Dermatology Note  Encounter Date: Nov 9, 2023  Office Visit     Dermatology Problem List:  Last FBSE 11/9/23, recommend yearly  0. Lesion to monitor: 4 cm x 2 cm speckled patch on the right lateral posterior hip  1. Rosacea, papular  - Current tx: MetroCream 0.75% , permethrin 5% cream PRN  - s/p doxycycline 50 mg daily for years - flared when attempted to stop. Did doxycycline 100 mg BID x3 months starting 9/2019, azelaic acid  - Future Considerations: Rhofade, PDL  2. Seborrheic dermatitis, scalp  - Current Tx: fluocinolone 0.01% for up to 1 week in a row per month  - Clobex shampoo to expensive  3. Corns - bilateral great toes  - Tx: urea cream 40%, pumice stone, OTC corn pads  4. Cyst, left clavicle. Resolved 11/3/22.  5. Female pattern hair loss  - Current Tx: minoxidil 5% foam/soln QD    FHx: Negative for skin cancers or skin conditions.    ____________________________________________    Assessment & Plan:    # Rosacea. Remains unchanged.   - Continue daily use of gentle facial cleanser.  - Continue metronidazole 0.75% cream daily. Refilled today.  - For flares, continue permethrin 5% cream PRN. Refilled provided today.    # 4 cm x 2 cm speckled patch on the right lateral posterior hip. Unchanged from prior exam on 11/3/22. C/w with nevus spilus  - Continue to monitor for changes.    # Corns - bilateral great toes.   - Continue urea cream 30%, refilled     # Seborrheic dermatitis, scalp. stable  - Continue fluocinolone 0.01% for up to 1 week in a row per month. Refilled today.    # Female pattern hair loss.   - Restart minoxidil 5% foam/soln every day if patient would like, reviewed irritation and shedding.         # Solar lentigines. No further management at this time.  - ABCDEs: Counseled ABCDEs of melanoma: Asymmetry, Border (irregularity), Color (not uniform, changes in color), Diameter (greater than 6 mm which is about the size of a pencil eraser), and Evolving (any  changes in preexisting moles).  - Sun protection: Counseled SPF30+ sunscreen, UPF clothing, sun avoidance, tanning bed avoidance.            Procedures Performed:   None.    Follow-up: 1 year(s) in-person, or earlier for new or changing lesions    Staff and Scribe:     Scribe Disclosure:   IKathryn, am serving as a scribe to document services personally performed by Ava Rao MD based on data collection and the provider's statements to me.     I, Val Rogers, am serving as a scribe to document services personally performed by Dr. Ava Rao, based on data collection and the provider's statements to me.     Provider Disclosure:   The documentation recorded by the scribe accurately reflects the services I personally performed and the decisions made by me.    Ava Rao MD    Department of Dermatology  [facility]: Phone: [phone], Fax:[phone]  [facility]: Phone: [phone], Fax: [phone]   ____________________________________________    CC: Skin Check (FBSE.  No areas of concern.  No HX of skin cancer.  HX of rosacea. )    HPI:  Ms. Marlene Khan is a(n) 72 year old female who presents today as a return patient for a skin check.    Nothing bleeding, crusting, or changing.     Patient is otherwise feeling well, without additional skin concerns.    Labs Reviewed:  N/A    Physical Exam:  Vitals: There were no vitals taken for this visit.  SKIN: Full skin, which includes the head/face, both arms, chest, back, abdomen,both legs, genitalia and/or groin buttocks, digits and/or nails, was examined.  - Mild erythema on the scalp.  - Scattered brown macules on sun exposed areas.  - 4 cm x 2 cm speckled patch on the right lateral posterior hip.   - Scattered telangiectasias over the face.   - Speckled patch on right buttocks.  - Rosacea no change.  - No other lesions  of concern on areas examined.     Medications:  Current Outpatient Medications   Medication    CALCIUM-VITAMIN D PO    cetirizine (ZYRTEC) 10 MG tablet    Cranberry 300 MG TABS    fluocinolone (SYNALAR) 0.01 % solution    metroNIDAZOLE (METROCREAM) 0.75 % external cream    metroNIDAZOLE (METROGEL) 0.75 % external gel    multivitamin (CENTRUM SILVER) tablet    multivitamin (OCUVITE) TABS    permethrin (ELIMITE) 5 % external cream    urea (MIGUELINA-LO) 30 % external cream     No current facility-administered medications for this visit.      Past Medical History:   Patient Active Problem List   Diagnosis    Persistent disorder of initiating or maintaining sleep    Phobia    Lobular carcinoma in situ    Intermittent asthma    Advanced directives, counseling/discussion    Rosacea    Malignant neoplasm of colon (H)    Gastroesophageal reflux disease with esophagitis     Past Medical History:   Diagnosis Date    Abnormal Papanicolaou smear of cervix and cervical HPV     Abn. Pap smear (cervix), age 24  s/p cryotherapy    Anxiety state, unspecified     anxiety with public speaking    Cough     Excessive or frequent menstruation     Other congenital anomaly of uterus     uterine fibroids    Rosacea 8/16/2011    Unspecified sinusitis (chronic)     Chronic sinusitis        CC No referring provider defined for this encounter. on close of this encounter.

## 2023-11-03 NOTE — PATIENT INSTRUCTIONS

## 2023-11-09 ENCOUNTER — OFFICE VISIT (OUTPATIENT)
Dept: DERMATOLOGY | Facility: CLINIC | Age: 73
End: 2023-11-09
Payer: MEDICARE

## 2023-11-09 DIAGNOSIS — L81.4 SOLAR LENTIGO: ICD-10-CM

## 2023-11-09 DIAGNOSIS — L65.8 FEMALE PATTERN HAIR LOSS: ICD-10-CM

## 2023-11-09 DIAGNOSIS — L21.9 SEBORRHEIC DERMATITIS: ICD-10-CM

## 2023-11-09 DIAGNOSIS — L71.9 ROSACEA: Primary | ICD-10-CM

## 2023-11-09 DIAGNOSIS — L72.9 CYST OF SKIN: ICD-10-CM

## 2023-11-09 DIAGNOSIS — L84 CORN: ICD-10-CM

## 2023-11-09 PROCEDURE — 99214 OFFICE O/P EST MOD 30 MIN: CPT | Performed by: DERMATOLOGY

## 2023-11-09 RX ORDER — PERMETHRIN 50 MG/G
CREAM TOPICAL
Qty: 60 G | Refills: 11 | Status: SHIPPED | OUTPATIENT
Start: 2023-11-09

## 2023-11-09 RX ORDER — FLUOCINOLONE ACETONIDE 0.1 MG/ML
SOLUTION TOPICAL
Qty: 60 ML | Refills: 3 | Status: SHIPPED | OUTPATIENT
Start: 2023-11-09

## 2023-11-09 NOTE — NURSING NOTE
Marlene Khan's goals for this visit include:   Chief Complaint   Patient presents with    Skin Check     FBSE.  No areas of concern.  No HX of skin cancer.  HX of rosacea.       She requests these members of her care team be copied on today's visit information:     PCP: Deedee Wilde    Referring Provider:  No referring provider defined for this encounter.    There were no vitals taken for this visit.    Do you need any medication refills at today's visit?     Heather Arzola on 11/9/2023 at 9:40 AM

## 2023-11-09 NOTE — LETTER
11/9/2023         RE: Marlene Khan  [address]  [place] [address]        Dear Colleague,    Thank you for referring your patient, Marlene Khan, to the [facility]. Please see a copy of my visit note below.      [facility] Dermatology Note  Encounter Date: Nov 9, 2023  Office Visit     Dermatology Problem List:  Last FBSE 11/9/23, recommend yearly  0. Lesion to monitor: 4 cm x 2 cm speckled patch on the right lateral posterior hip  1. Rosacea, papular  - Current tx: MetroCream 0.75% , permethrin 5% cream PRN  - s/p doxycycline 50 mg daily for years - flared when attempted to stop. Did doxycycline 100 mg BID x3 months starting 9/2019, azelaic acid  - Future Considerations: Rhofade, PDL  2. Seborrheic dermatitis, scalp  - Current Tx: fluocinolone 0.01% for up to 1 week in a row per month  - Clobex shampoo to expensive  3. Corns - bilateral great toes  - Tx: urea cream 40%, pumice stone, OTC corn pads  4. Cyst, left clavicle. Resolved 11/3/22.  5. Female pattern hair loss  - Current Tx: minoxidil 5% foam/soln QD    FHx: Negative for skin cancers or skin conditions.    ____________________________________________    Assessment & Plan:    # Rosacea. Remains unchanged.   - Continue daily use of gentle facial cleanser.  - Continue metronidazole 0.75% cream daily. Refilled today.  - For flares, continue permethrin 5% cream PRN. Refilled provided today.    # 4 cm x 2 cm speckled patch on the right lateral posterior hip. Unchanged from prior exam on 11/3/22. C/w with nevus spilus  - Continue to monitor for changes.    # Corns - bilateral great toes.   - Continue urea cream 30%, refilled     # Seborrheic dermatitis, scalp. stable  - Continue fluocinolone 0.01% for up to 1 week in a row per month. Refilled today.    # Female pattern hair loss.   - Restart minoxidil 5% foam/soln every day if patient would like, reviewed irritation and shedding.         # Solar  lentigines. No further management at this time.  - ABCDEs: Counseled ABCDEs of melanoma: Asymmetry, Border (irregularity), Color (not uniform, changes in color), Diameter (greater than 6 mm which is about the size of a pencil eraser), and Evolving (any changes in preexisting moles).  - Sun protection: Counseled SPF30+ sunscreen, UPF clothing, sun avoidance, tanning bed avoidance.            Procedures Performed:   None.    Follow-up: 1 year(s) in-person, or earlier for new or changing lesions    Staff and Scribe:     Scribe Disclosure:   IKathryn, am serving as a scribe to document services personally performed by Ava Rao MD based on data collection and the provider's statements to me.     I, Val Rogers, am serving as a scribe to document services personally performed by Dr. Ava Rao, based on data collection and the provider's statements to me.     Provider Disclosure:   The documentation recorded by the scribe accurately reflects the services I personally performed and the decisions made by me.    Ava Rao MD    Department of Dermatology  [facility]: Phone: [phone], Fax:[phone]  [facility]: Phone: [phone], Fax: [phone]   ____________________________________________    CC: Skin Check (FBSE.  No areas of concern.  No HX of skin cancer.  HX of rosacea. )    HPI:  Ms. Marlene Khan is a(n) 72 year old female who presents today as a return patient for a skin check.    Nothing bleeding, crusting, or changing.     Patient is otherwise feeling well, without additional skin concerns.    Labs Reviewed:  N/A    Physical Exam:  Vitals: There were no vitals taken for this visit.  SKIN: Full skin, which includes the head/face, both arms, chest, back, abdomen,both legs, genitalia and/or groin buttocks, digits and/or nails, was examined.  - Mild erythema  on the scalp.  - Scattered brown macules on sun exposed areas.  - 4 cm x 2 cm speckled patch on the right lateral posterior hip.   - Scattered telangiectasias over the face.   - Speckled patch on right buttocks.  - Rosacea no change.  - No other lesions of concern on areas examined.     Medications:  Current Outpatient Medications   Medication     CALCIUM-VITAMIN D PO     cetirizine (ZYRTEC) 10 MG tablet     Cranberry 300 MG TABS     fluocinolone (SYNALAR) 0.01 % solution     metroNIDAZOLE (METROCREAM) 0.75 % external cream     metroNIDAZOLE (METROGEL) 0.75 % external gel     multivitamin (CENTRUM SILVER) tablet     multivitamin (OCUVITE) TABS     permethrin (ELIMITE) 5 % external cream     urea (MIGUELINA-LO) 30 % external cream     No current facility-administered medications for this visit.      Past Medical History:   Patient Active Problem List   Diagnosis     Persistent disorder of initiating or maintaining sleep     Phobia     Lobular carcinoma in situ     Intermittent asthma     Advanced directives, counseling/discussion     Rosacea     Malignant neoplasm of colon (H)     Gastroesophageal reflux disease with esophagitis     Past Medical History:   Diagnosis Date     Abnormal Papanicolaou smear of cervix and cervical HPV     Abn. Pap smear (cervix), age 24  s/p cryotherapy     Anxiety state, unspecified     anxiety with public speaking     Cough      Excessive or frequent menstruation      Other congenital anomaly of uterus     uterine fibroids     Rosacea 8/16/2011     Unspecified sinusitis (chronic)     Chronic sinusitis        CC No referring provider defined for this encounter. on close of this encounter.      Again, thank you for allowing me to participate in the care of your patient.        Sincerely,        Ava Rao MD

## 2024-03-16 ENCOUNTER — HEALTH MAINTENANCE LETTER (OUTPATIENT)
Age: 74
End: 2024-03-16

## 2024-11-11 NOTE — PROGRESS NOTES
[facility] Dermatology Note  Encounter Date: Nov 14, 2024  Office Visit     Dermatology Problem List:  Last FBSE 11/14/24, recommend yearly    1. Rosacea, papular  - Current tx: MetroCream 0.75% , permethrin 5% cream PRN  - s/p doxycycline 50 mg daily for years - flared when attempted to stop. Did doxycycline 100 mg BID x3 months starting 9/2019, azelaic acid  - Future Considerations: Rhofade, PDL  2. Seborrheic dermatitis, scalp  - Current Tx: fluocinolone 0.01% for up to 1 week in a row per month  - Clobex shampoo to expensive  3. Corns - bilateral great toes  - Tx: urea cream 40%, pumice stone, OTC corn pads  4. Cyst, left clavicle. Resolved 11/3/22.  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    ____________________________________________    Assessment & Plan:    # Rosacea. Remains unchanged.   - Continue daily use of gentle facial cleanser.  - Continue metronidazole 0.75% cream daily. Refilled today.   - For flares, continue permethrin 5% cream PRN. Refill submitted     # Nevus spilus 4 cm x 2 cm speckled patch on the right lateral posterior hip. Unchanged from prior photo on 11/3/22.   - Continue to monitor for changes.     # Corns - bilateral great toes.   - Urea cream 30% cost prohibitive  - Recommended OTC [employer]     # Seborrheic dermatitis, scalp. Stable  - Continue fluocinolone 0.01% for up to 1 week in a row per month.       # Female pattern hair loss.   - Recommended minoxidil 5% foam/soln every day if patient would like, reviewed irritation and shedding. Patient has not started yet.   - Handout provided    # EIC   - Reviewed excision today. Patient defers today.       Procedures Performed:   none    Follow-up: 1 year(s) in-person, or earlier for new or changing lesions    Staff and Scribe:     Scribe Disclosure:   Matilde RUELAS  Manuel, am serving as a scribe to document services personally performed by Ava Rao MD based on data collection and the provider's statements to me.     Provider Disclosure:   The documentation recorded by the scribe accurately reflects the services I personally performed and the decisions made by me.    Ava Rao MD    Department of Dermatology  [facility]: Phone: [phone], Fax:[phone]  [facility]: Phone: [phone], Fax: [phone]   ____________________________________________    CC: Skin Check (No areas of concern)    HPI:  Ms. Marlene Khan is a(n) 73 year old female who presents today as a return patient for skin check.    Today, patient reports no spots of concern. Has not started using topical minoxidil. Reports the prescription urea is very expensive. Reports a clogged pore on the       Patient is otherwise feeling well, without additional skin concerns.    Labs Reviewed:  N/A    Physical Exam:  Vitals: There were no vitals taken for this visit.  SKIN:Full skin including buttock and genitals areas was performed. The exam included the head/face, neck, both arms, chest, back, abdomen, both legs, digits and/or nails.  Abena Castillo RN   - Mason part is 2  - There is thinning on the scalp.   - There is a raised dome shaped  nodule with a central punctum located on left chest..   - There is unchanged speckled patch on the R lateral hip when compared to previous photography.    - No other lesions of concern on areas examined.     Medications:  Current Outpatient Medications   Medication Sig Dispense Refill    cetirizine (ZYRTEC) 10 MG tablet Take 10 mg by mouth daily as needed      ciprofloxacin (CIPRO) 500 MG tablet Take 500 mg by mouth daily as needed.      Cranberry 300 MG TABS Take 1 tablet by mouth 2 times daily.      metroNIDAZOLE (METROCREAM) 0.75 %  external cream Apply thin layer to face once to twice daily. 45 g 11    multivitamin (CENTRUM SILVER) tablet Take 1 tablet by mouth daily      multivitamin (OCUVITE) TABS Take 1 tablet by mouth daily      Vitamin D3 (VITAMIN D-1000 MAX ST) 25 mcg (1000 units) tablet Take 25 mcg by mouth daily.      CALCIUM-VITAMIN D PO Take 2 tablets by mouth 2 times daily.      fluocinolone (SYNALAR) 0.01 % solution Use nightly for up to 1 week in a row per month for itchy scalp (Patient not taking: Reported on 11/14/2024) 60 mL 3    permethrin (ELIMITE) 5 % external cream Apply thin layer to face once to twice daily. (Patient not taking: Reported on 11/14/2024) 60 g 11    urea (MIGUELINA-LO) 30 % external cream Apply twice daily to corn (Patient not taking: Reported on 11/14/2024) 30 g 1     No current facility-administered medications for this visit.      Past Medical History:   Patient Active Problem List   Diagnosis    Persistent disorder of initiating or maintaining sleep    Phobia    Lobular carcinoma in situ    Intermittent asthma    Rosacea    Malignant neoplasm of colon (H)    Gastroesophageal reflux disease with esophagitis     Past Medical History:   Diagnosis Date    Abnormal Papanicolaou smear of cervix and cervical HPV     Abn. Pap smear (cervix), age 24  s/p cryotherapy    Anxiety state, unspecified     anxiety with public speaking    Cough     Excessive or frequent menstruation     Other congenital anomaly of uterus     uterine fibroids    Rosacea 8/16/2011    Unspecified sinusitis (chronic)     Chronic sinusitis        CC Ava Rao MD  [address]  [place] [address] on close of this encounter.

## 2024-11-11 NOTE — PATIENT INSTRUCTIONS
Gold Ambrocio Foot Cream        Checking for Skin Cancer  You can help find cancer early by checking your skin each month. There are 3 main kinds of skin cancer: melanoma, basal cell carcinoma, and squamous cell carcinoma. Doing monthly skin checks is the best way to find new marks, sores, or skin changes. Follow these instructions for checking your skin.   The ABCDEs of checking moles for melanoma   Check your moles or growths for signs of melanoma using ABCDE:   Asymmetry: The sides of the mole or growth don t match.  Border: The edges are ragged, notched, or blurred.  Color: The color within the mole or growth varies. It could be black, brown, tan, white, or shades of red, gray, or blue.  Diameter: The mole or growth is larger than   inch or 6 mm (size of a pencil eraser).  Evolving: The size, shape, texture, or color of the mole or growth is changing.     ABCDE's of moles on light skin.        ABCDE's of moles on dark skin may be harder to identify.     Checking for other types of skin cancer  Basal cell carcinoma or squamous cell carcinoma cause symptoms like:     A spot or mole that looks different from all other marks on your skin  Changes in how an area feels, such as itching, tenderness, or pain  Changes in the skin's surface, such as oozing, bleeding, or scaliness  A sore that doesn't heal  New swelling, redness, or spread of color beyond the border of a mole    Who s at risk?  Anyone of any skin color can get skin cancer. But you're at greater risk if you have:   Fair skin that freckles easily and burns instead of tanning  Light-colored or red hair  Light-colored eyes  Many moles or abnormal moles on your skin  A long history of unprotected exposure to sunlight or tanning beds  A history of many blistering sunburns as a child or teen  A family history of skin cancer  Been exposed to radiation or chemicals  A weakened immune system  Been exposed to arsenic  If you've had skin cancer in the past, you're at  high risk of having it again.   How to check your skin  Do your monthly skin checkups in front of a full-length mirror. Use a room with good lighting so it's easier to see. Use a hand mirror to look at hard-to-see places like your buttocks and back. You can also have a trusted friend or family member help you with these checks. Check every part of your body, including your:   Head (ears, face, neck, and scalp)  Torso (front, back, sides, and under breasts)  Arms (tops, undersides, and armpits)  Hands (palms, backs, and fingers, including under the nails)  Lower back, buttocks, and genitals  Legs (front, back, and sides)  Feet (tops, soles, toes, including under the nails, and between toes)  Watch for new spots on your skin or a spot that's changing in color, shape, size.   If you have a lot of moles, take digital photos of them each month. Make sure to take photos both up close and from a distance. These can help you see if any moles change over time.   Know your skin  Most skin changes aren't cancer. But if you see any changes in your skin, call your healthcare provider right away. Only they can tell you if a change is a problem. If you have skin cancer, seeing your provider can be the first step to getting the treatment that could save your life.   Linda last reviewed this educational content on 10/1/2021    9098-6056 [employer]. All rights reserved. This information is not intended as a substitute for professional medical care. Always follow your healthcare professional's instructions.

## 2024-11-14 ENCOUNTER — OFFICE VISIT (OUTPATIENT)
Dept: DERMATOLOGY | Facility: CLINIC | Age: 74
End: 2024-11-14
Attending: DERMATOLOGY
Payer: MEDICARE

## 2024-11-14 DIAGNOSIS — L72.0 EIC (EPIDERMAL INCLUSION CYST): Primary | ICD-10-CM

## 2024-11-14 DIAGNOSIS — L71.9 ROSACEA: ICD-10-CM

## 2024-11-14 DIAGNOSIS — L21.9 SEBORRHEIC DERMATITIS: ICD-10-CM

## 2024-11-14 RX ORDER — FLUOCINOLONE ACETONIDE 0.1 MG/ML
SOLUTION TOPICAL
Qty: 60 ML | Refills: 3 | Status: SHIPPED | OUTPATIENT
Start: 2024-11-14

## 2024-11-14 RX ORDER — CIPROFLOXACIN 500 MG/1
500 TABLET, FILM COATED ORAL DAILY PRN
COMMUNITY

## 2024-11-14 RX ORDER — PERMETHRIN 50 MG/G
CREAM TOPICAL
Qty: 60 G | Refills: 11 | Status: SHIPPED | OUTPATIENT
Start: 2024-11-14

## 2024-11-14 RX ORDER — VITAMIN B COMPLEX
25 TABLET ORAL DAILY
COMMUNITY

## 2024-11-14 NOTE — LETTER
11/14/2024      Marlene Khan  [address]  [facility] [address]      Dear Colleague,    Thank you for referring your patient, Marlene Khan, to the [facility]. Please see a copy of my visit note below.      [facility] Dermatology Note  Encounter Date: Nov 14, 2024  Office Visit     Dermatology Problem List:  Last FBSE 11/14/24, recommend yearly    1. Rosacea, papular  - Current tx: MetroCream 0.75% , permethrin 5% cream PRN  - s/p doxycycline 50 mg daily for years - flared when attempted to stop. Did doxycycline 100 mg BID x3 months starting 9/2019, azelaic acid  - Future Considerations: Rhofade, PDL  2. Seborrheic dermatitis, scalp  - Current Tx: fluocinolone 0.01% for up to 1 week in a row per month  - Clobex shampoo to expensive  3. Corns - bilateral great toes  - Tx: urea cream 40%, pumice stone, OTC corn pads  4. Cyst, left clavicle. Resolved 11/3/22.  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    ____________________________________________    Assessment & Plan:    # Rosacea. Remains unchanged.   - Continue daily use of gentle facial cleanser.  - Continue metronidazole 0.75% cream daily. Refilled today.   - For flares, continue permethrin 5% cream PRN. Refill submitted     # Nevus spilus 4 cm x 2 cm speckled patch on the right lateral posterior hip. Unchanged from prior photo on 11/3/22.   - Continue to monitor for changes.     # Corns - bilateral great toes.   - Urea cream 30% cost prohibitive  - Recommended OTC Gold Bond     # Seborrheic dermatitis, scalp. Stable  - Continue fluocinolone 0.01% for up to 1 week in a row per month.       # Female pattern hair loss.   - Recommended minoxidil 5% foam/soln every day if patient would like, reviewed irritation and shedding. Patient has not started yet.    - Handout provided    # EIC   - Reviewed excision today. Patient defers today.       Procedures Performed:   none    Follow-up: 1 year(s) in-person, or earlier for new or changing lesions    Staff and Scribe:     Scribe Disclosure:   I, Matilde Jackson, am serving as a scribe to document services personally performed by Ava Rao MD based on data collection and the provider's statements to me.     Provider Disclosure:   The documentation recorded by the scribe accurately reflects the services I personally performed and the decisions made by me.    Ava Rao MD    Department of Dermatology  [facility]: Phone: [phone], Fax:[phone]  [facility]: Phone: [phone], Fax: [phone]   ____________________________________________    CC: Skin Check (No areas of concern)    HPI:  Ms. Marlene Khan is a(n) 73 year old female who presents today as a return patient for skin check.    Today, patient reports no spots of concern. Has not started using topical minoxidil. Reports the prescription urea is very expensive. Reports a clogged pore on the       Patient is otherwise feeling well, without additional skin concerns.    Labs Reviewed:  N/A    Physical Exam:  Vitals: There were no vitals taken for this visit.  SKIN:Full skin including buttock and genitals areas was performed. The exam included the head/face, neck, both arms, chest, back, abdomen, both legs, digits and/or nails.  Abena Castillo RN   - Mason part is 2  - There is thinning on the scalp.   - There is a raised dome shaped  nodule with a central punctum located on left chest..   - There is unchanged speckled patch on the R lateral hip when compared to previous photography.    - No other lesions of concern on areas examined.     Medications:  Current Outpatient Medications   Medication Sig Dispense Refill     cetirizine  (ZYRTEC) 10 MG tablet Take 10 mg by mouth daily as needed       ciprofloxacin (CIPRO) 500 MG tablet Take 500 mg by mouth daily as needed.       Cranberry 300 MG TABS Take 1 tablet by mouth 2 times daily.       metroNIDAZOLE (METROCREAM) 0.75 % external cream Apply thin layer to face once to twice daily. 45 g 11     multivitamin (CENTRUM SILVER) tablet Take 1 tablet by mouth daily       multivitamin (OCUVITE) TABS Take 1 tablet by mouth daily       Vitamin D3 (VITAMIN D-1000 MAX ST) 25 mcg (1000 units) tablet Take 25 mcg by mouth daily.       CALCIUM-VITAMIN D PO Take 2 tablets by mouth 2 times daily.       fluocinolone (SYNALAR) 0.01 % solution Use nightly for up to 1 week in a row per month for itchy scalp (Patient not taking: Reported on 11/14/2024) 60 mL 3     permethrin (ELIMITE) 5 % external cream Apply thin layer to face once to twice daily. (Patient not taking: Reported on 11/14/2024) 60 g 11     urea (MIGUELINA-LO) 30 % external cream Apply twice daily to corn (Patient not taking: Reported on 11/14/2024) 30 g 1     No current facility-administered medications for this visit.      Past Medical History:   Patient Active Problem List   Diagnosis     Persistent disorder of initiating or maintaining sleep     Phobia     Lobular carcinoma in situ     Intermittent asthma     Rosacea     Malignant neoplasm of colon (H)     Gastroesophageal reflux disease with esophagitis     Past Medical History:   Diagnosis Date     Abnormal Papanicolaou smear of cervix and cervical HPV     Abn. Pap smear (cervix), age 24  s/p cryotherapy     Anxiety state, unspecified     anxiety with public speaking     Cough      Excessive or frequent menstruation      Other congenital anomaly of uterus     uterine fibroids     Rosacea 8/16/2011     Unspecified sinusitis (chronic)     Chronic sinusitis        CC Ava Rao MD  [address] [facility] [address]  [place] [address] on close of this encounter.    Again, thank you for allowing me to  participate in the care of your patient.        Sincerely,        Ava Rao MD

## 2024-11-14 NOTE — NURSING NOTE
"Marlene Khan's chief complaint for this visit includes:  Chief Complaint   Patient presents with    Skin Check     No areas of concern     PCP: Deedee Wilde    Referring Provider:  Ava Rao MD  [address] [place] [id number]    There were no vitals taken for this visit.  Data Unavailable        Allergies   Allergen Reactions    Diphenhydramine Shortness Of Breath    Amoxicillin-Pot Clavulanate GI Disturbance     \"makes me very sick\"    Latex     Sulfa Antibiotics          Do you need any medication refills at today's visit? No       "

## 2025-03-22 ENCOUNTER — HEALTH MAINTENANCE LETTER (OUTPATIENT)
Age: 75
End: 2025-03-22

## 2025-06-02 NOTE — DISCHARGE INSTRUCTIONS
[facility]    Home Care Following Endoscopy          Activity:    You have just undergone an endoscopic procedure performed with Monitored Anesthesia Care / sedation.  Do not work or operate machinery (including a car) or drink alcohol (including beer) for at least 12 hours.      I encourage you to walk and attempt to pass this air as soon as possible.    Diet:    Return to the diet you were on before your procedure but eat lightly for the first 12-24 hours.    Drink plenty of water.    Resume any regular medications unless otherwise advised by your physician.      You had a biopsy and polyps removed so please refrain from aspirin or aspirin products for 2 days.     Pain:    You may take Tylenol as needed for pain.  Expected Recovery:    You can expect some mild abdominal fullness and/or discomfort due to the air used to inflate your intestinal tract.      Call Your Physician if You Have:     After Colonoscopy:  o Worsening persisting abdominal pain which is worse with activity.  o Fevers (>101 degrees F), chills or shakes.  o Passage of continued blood with bowel movements.   Any questions or concerns about your recovery, please call [phone] or after hours [phone] [facility] Nurse Advice Line (24 hour line).    Follow-up Care:    You should receive a call or letter with your results within 1 week. Please call if you have not received a notification of your results.      
Patient will be independent in all bed mobility in 2 weeks in order to increase safety at home.

## 2025-07-21 ENCOUNTER — PATIENT OUTREACH (OUTPATIENT)
Dept: CARE COORDINATION | Facility: CLINIC | Age: 75
End: 2025-07-21
Payer: MEDICARE